# Patient Record
Sex: FEMALE | Race: WHITE | NOT HISPANIC OR LATINO | Employment: FULL TIME | URBAN - METROPOLITAN AREA
[De-identification: names, ages, dates, MRNs, and addresses within clinical notes are randomized per-mention and may not be internally consistent; named-entity substitution may affect disease eponyms.]

---

## 2018-06-27 ENCOUNTER — HOSPITAL ENCOUNTER (OUTPATIENT)
Dept: RADIOLOGY | Facility: OTHER | Age: 38
Discharge: HOME | End: 2018-06-27
Attending: SPECIALIST
Payer: COMMERCIAL

## 2018-06-27 DIAGNOSIS — R10.11 ABDOMINAL PAIN, RIGHT UPPER QUADRANT: ICD-10-CM

## 2018-06-27 PROCEDURE — 76705 ECHO EXAM OF ABDOMEN: CPT | Mod: 26,,, | Performed by: RADIOLOGY

## 2018-06-27 PROCEDURE — 76705 ECHO EXAM OF ABDOMEN: CPT | Mod: TC

## 2022-02-04 ENCOUNTER — TELEPHONE (OUTPATIENT)
Dept: OBSTETRICS AND GYNECOLOGY | Facility: CLINIC | Age: 42
End: 2022-02-04
Payer: COMMERCIAL

## 2022-03-11 ENCOUNTER — INITIAL PRENATAL (OUTPATIENT)
Dept: OBSTETRICS AND GYNECOLOGY | Facility: CLINIC | Age: 42
End: 2022-03-11
Payer: COMMERCIAL

## 2022-03-11 ENCOUNTER — PATIENT MESSAGE (OUTPATIENT)
Dept: ADMINISTRATIVE | Facility: OTHER | Age: 42
End: 2022-03-11
Payer: COMMERCIAL

## 2022-03-11 ENCOUNTER — PATIENT MESSAGE (OUTPATIENT)
Dept: OBSTETRICS AND GYNECOLOGY | Facility: CLINIC | Age: 42
End: 2022-03-11
Payer: COMMERCIAL

## 2022-03-11 VITALS — DIASTOLIC BLOOD PRESSURE: 70 MMHG | SYSTOLIC BLOOD PRESSURE: 130 MMHG | WEIGHT: 154.56 LBS

## 2022-03-11 DIAGNOSIS — O09.292 HISTORY OF IUGR (INTRAUTERINE GROWTH RETARDATION) AND STILLBIRTH, CURRENTLY PREGNANT, SECOND TRIMESTER: ICD-10-CM

## 2022-03-11 DIAGNOSIS — O09.32 INITIAL OBSTETRIC VISIT IN SECOND TRIMESTER: Primary | ICD-10-CM

## 2022-03-11 DIAGNOSIS — O09.92 SUPERVISION OF HIGH RISK PREGNANCY IN SECOND TRIMESTER: ICD-10-CM

## 2022-03-11 DIAGNOSIS — K21.9 GASTROESOPHAGEAL REFLUX IN PREGNANCY: ICD-10-CM

## 2022-03-11 DIAGNOSIS — O99.619 GASTROESOPHAGEAL REFLUX IN PREGNANCY: ICD-10-CM

## 2022-03-11 DIAGNOSIS — O09.522 MULTIGRAVIDA OF ADVANCED MATERNAL AGE IN SECOND TRIMESTER: ICD-10-CM

## 2022-03-11 DIAGNOSIS — Z87.59 HISTORY OF GESTATIONAL HYPERTENSION: ICD-10-CM

## 2022-03-11 PROBLEM — O09.299 HISTORY OF IUGR (INTRAUTERINE GROWTH RETARDATION) AND STILLBIRTH, CURRENTLY PREGNANT: Status: ACTIVE | Noted: 2022-03-11

## 2022-03-11 PROBLEM — O13.9 PREGNANCY INDUCED HYPERTENSION: Status: RESOLVED | Noted: 2022-03-11 | Resolved: 2022-03-11

## 2022-03-11 PROBLEM — N20.0 CALCULUS OF KIDNEY: Status: ACTIVE | Noted: 2022-03-11

## 2022-03-11 PROBLEM — O09.299 HISTORY OF IUGR (INTRAUTERINE GROWTH RETARDATION) AND STILLBIRTH, CURRENTLY PREGNANT: Status: RESOLVED | Noted: 2022-03-11 | Resolved: 2022-03-11

## 2022-03-11 PROBLEM — E04.9 GOITER: Status: ACTIVE | Noted: 2022-03-11

## 2022-03-11 PROBLEM — O13.9 PREGNANCY INDUCED HYPERTENSION: Status: ACTIVE | Noted: 2022-03-11

## 2022-03-11 PROCEDURE — 0502F PR SUBSEQUENT PRENATAL CARE: ICD-10-PCS | Mod: CPTII,S$GLB,, | Performed by: OBSTETRICS & GYNECOLOGY

## 2022-03-11 PROCEDURE — 99999 PR PBB SHADOW E&M-EST. PATIENT-LVL III: CPT | Mod: PBBFAC,,, | Performed by: OBSTETRICS & GYNECOLOGY

## 2022-03-11 PROCEDURE — 87086 URINE CULTURE/COLONY COUNT: CPT | Performed by: OBSTETRICS & GYNECOLOGY

## 2022-03-11 PROCEDURE — 87591 N.GONORRHOEAE DNA AMP PROB: CPT | Performed by: OBSTETRICS & GYNECOLOGY

## 2022-03-11 PROCEDURE — 99999 PR PBB SHADOW E&M-EST. PATIENT-LVL III: ICD-10-PCS | Mod: PBBFAC,,, | Performed by: OBSTETRICS & GYNECOLOGY

## 2022-03-11 PROCEDURE — 87491 CHLMYD TRACH DNA AMP PROBE: CPT | Performed by: OBSTETRICS & GYNECOLOGY

## 2022-03-11 PROCEDURE — 0502F SUBSEQUENT PRENATAL CARE: CPT | Mod: CPTII,S$GLB,, | Performed by: OBSTETRICS & GYNECOLOGY

## 2022-03-11 RX ORDER — PRENATAL VIT,CAL 74/IRON/FOLIC 27 MG-1 MG
TABLET ORAL
COMMUNITY
End: 2023-08-25

## 2022-03-11 RX ORDER — NAPROXEN SODIUM 220 MG/1
81 TABLET, FILM COATED ORAL DAILY
Qty: 90 TABLET | Refills: 3 | Status: SHIPPED | OUTPATIENT
Start: 2022-03-11 | End: 2022-08-26

## 2022-03-11 RX ORDER — PANTOPRAZOLE SODIUM 20 MG/1
20 TABLET, DELAYED RELEASE ORAL DAILY
Qty: 30 TABLET | Refills: 1 | Status: SHIPPED | OUTPATIENT
Start: 2022-03-11 | End: 2022-06-28

## 2022-03-11 NOTE — PROGRESS NOTES
Chief Complaint: Initial OB Visit     HPI:      Maggie Krishnan is a 41 y.o.  who is known to me presents complaining of absence of menses. Pregnancy confirmed with Dr. Scottie Pham.  JUDY 2022 by 7 week US.  Denies nausea and emesis.  Notes decreased appetite. Denies pelvic pain.  Denies cramping.  Denies vaginal bleeding.     OB history complicated by AMA and history of GHTN and IUGR in prior pregnancy.   Pregnancy was not planned but is desired.     No LMP recorded. Patient is pregnant.    COVID vaccine: Completed + booster  Flu shot: Completed  ZIKA travel or exposure: Denies    Past Medical History:   Diagnosis Date    History of gestational hypertension     History of IUGR (intrauterine growth retardation) and stillbirth, currently pregnant 3/11/2022     Past Surgical History:   Procedure Laterality Date    FOOT SURGERY Left     WISDOM TOOTH EXTRACTION       Social History     Tobacco Use    Smoking status: Never Smoker    Smokeless tobacco: Never Used   Substance Use Topics    Alcohol use: Never    Drug use: Never     Family History   Problem Relation Age of Onset    Breast cancer Neg Hx     Colon cancer Neg Hx     Ovarian cancer Neg Hx      OB History    Para Term  AB Living   2 1 1         SAB IAB Ectopic Multiple Live Births                  # Outcome Date GA Lbr Nick/2nd Weight Sex Delivery Anes PTL Lv   2 Current            1 Term 17 37w0d   F Vag-Spont          Current Outpatient Medications:     prenatal vit,yareli 74-iron-folic (PRENATAL LOW IRON) 27 mg iron- 1 mg Tab, Take by mouth., Disp: , Rfl:   ROS:     GENERAL: Denies weight gain or weight loss. Feeling well overall.   SKIN: Denies rash or lesions.   BREASTS: Denies lumps or nipple discharge. + tenderness.  ABDOMEN: Denies abdominal pain, constipation, diarrhea. no nausea/no vomiting  URINARY: Denies dysuria, hematuria. + frequency.  NEUROLOGIC: Denies syncope or weakness.   PSYCHIATRIC: Denies  depression, anxiety or mood swings.    Physical Exam:      PHYSICAL EXAM:  /70   Wt 70.1 kg (154 lb 8.7 oz)   There is no height or weight on file to calculate BMI.     APPEARANCE: Well nourished, well developed, in no acute distress.  PSYCH: Appropriate mood and affect.  EXTREMITIES: No edema.  BREAST: No masses or skin lesions. No axillary lymphadenopathy.  PELVIC: Vulva without lesions. Vagina without lesions, discharge or bleeding. Cervical os closed without lesions, bleeding or discharge.    No results found for this or any previous visit.    Assessment/Plan:       ICD-10-CM ICD-9-CM    1. Initial obstetric visit in second trimester  O09.32 V23.7    2. Supervision of high risk pregnancy in second trimester  O09.92 V23.9    3. Multigravida of advanced maternal age in second trimester  O09.522 659.63          Counseling:      UPT positive.  EDC by 7 week US: 9/16/2022.     1. Dating US and initial OB labs reviewed.  2. Patient was counseled today on proper weight gain based on the [place] [employer]'s recommendations based on her pre-pregnancy weight.     The patient's Body mass index is There is no height or weight on file to calculate BMI.  -- Discussed IOM recommended weight gain of:          Weight category  Pre pre BMI                 Recommended TWG          Underweight Less than 18.5             28-40            Normal Weight  18.5-24.9                     25-35            Overweight        25-29.9                        15-25            Obese                 30 and greater              11-20    -- Discussed criteria for delivery at [facility] r/t excessive pre-preg weight or excessive weight gain:          Pre-pregnancy BMI over 40 or excess pregnancy weight gain defined as:          Pre-preg BMI < 18.5; Excess weight gain = > 60 pound          Pre-preg BMI 18.5-24.9;  Excess weight gain = > 53 pounds          Pre-preg BMI 25-29.9;  Excess weight gain = > 38 pounds          Pre-preg BMI > 30;  Excess  weight gain = > 30 pounds    3. Discussed foods to avoid in pregnancy (i.e. sushi, fish that are high in mercury, deli meat, and unpasteurized cheeses).   4. Discussed prenatal vitamin options and folic acid (i.e. stool softener, DHA).   5. Discussed nausea and emesis in pregnancy.  Recommend Unisom/B6 or Bonjesta.  Patient to notify the clinic if symptoms are refractory.   6. Optional genetic testing discussed - patient pending.   7. Optional carrier screening discussed - patient declines.  8. Safety of exercise discussed with patient, and continued active lifestyle encouraged.  9. Coronavirus, Influenza, and Zika virus precautions for both patient and her spouse.  10. Normal course of prenatal care reviewed.  11. Medications safe in pregnancy discussed and list provided.  12. Enrolled in [employer].  13. RTC 4 weeks for routine OB visit.       38 minutes of face-to-face discussion occurred during today's visit.     Use of the [employer] Patient Portal discussed and encouraged during today's visit.         Evelyn Rubin MD  [facility]  03/11/2022

## 2022-03-12 LAB — BACTERIA UR CULT: NO GROWTH

## 2022-03-15 ENCOUNTER — PATIENT MESSAGE (OUTPATIENT)
Dept: OBSTETRICS AND GYNECOLOGY | Facility: CLINIC | Age: 42
End: 2022-03-15
Payer: COMMERCIAL

## 2022-03-17 ENCOUNTER — PATIENT MESSAGE (OUTPATIENT)
Dept: OBSTETRICS AND GYNECOLOGY | Facility: CLINIC | Age: 42
End: 2022-03-17
Payer: COMMERCIAL

## 2022-03-17 LAB
C TRACH DNA SPEC QL NAA+PROBE: NOT DETECTED
N GONORRHOEA DNA SPEC QL NAA+PROBE: NOT DETECTED

## 2022-04-06 ENCOUNTER — ROUTINE PRENATAL (OUTPATIENT)
Dept: OBSTETRICS AND GYNECOLOGY | Facility: CLINIC | Age: 42
End: 2022-04-06
Payer: COMMERCIAL

## 2022-04-06 VITALS — SYSTOLIC BLOOD PRESSURE: 126 MMHG | DIASTOLIC BLOOD PRESSURE: 70 MMHG | WEIGHT: 155.88 LBS

## 2022-04-06 DIAGNOSIS — O09.292 HISTORY OF IUGR (INTRAUTERINE GROWTH RETARDATION) AND STILLBIRTH, CURRENTLY PREGNANT, SECOND TRIMESTER: ICD-10-CM

## 2022-04-06 DIAGNOSIS — O09.92 SUPERVISION OF HIGH RISK PREGNANCY IN SECOND TRIMESTER: Primary | ICD-10-CM

## 2022-04-06 DIAGNOSIS — O09.522 MULTIGRAVIDA OF ADVANCED MATERNAL AGE IN SECOND TRIMESTER: ICD-10-CM

## 2022-04-06 DIAGNOSIS — Z3A.16 16 WEEKS GESTATION OF PREGNANCY: ICD-10-CM

## 2022-04-06 DIAGNOSIS — Z87.59 HISTORY OF GESTATIONAL HYPERTENSION: ICD-10-CM

## 2022-04-06 DIAGNOSIS — K21.9 GASTROESOPHAGEAL REFLUX IN PREGNANCY: ICD-10-CM

## 2022-04-06 DIAGNOSIS — O99.619 GASTROESOPHAGEAL REFLUX IN PREGNANCY: ICD-10-CM

## 2022-04-06 PROCEDURE — 0502F PR SUBSEQUENT PRENATAL CARE: ICD-10-PCS | Mod: CPTII,S$GLB,, | Performed by: OBSTETRICS & GYNECOLOGY

## 2022-04-06 PROCEDURE — 0502F SUBSEQUENT PRENATAL CARE: CPT | Mod: CPTII,S$GLB,, | Performed by: OBSTETRICS & GYNECOLOGY

## 2022-04-06 NOTE — PROGRESS NOTES
Patient seen and examined.  No complaints. + FM. Denies LOF, cramping, VB. GERD improving. Anatomy US scheduled.  BP well controlled. RTC 4 weeks for routine OB care.

## 2022-04-25 ENCOUNTER — PATIENT MESSAGE (OUTPATIENT)
Dept: MATERNAL FETAL MEDICINE | Facility: CLINIC | Age: 42
End: 2022-04-25
Payer: COMMERCIAL

## 2022-04-26 ENCOUNTER — OFFICE VISIT (OUTPATIENT)
Dept: MATERNAL FETAL MEDICINE | Facility: CLINIC | Age: 42
End: 2022-04-26
Payer: COMMERCIAL

## 2022-04-26 ENCOUNTER — PROCEDURE VISIT (OUTPATIENT)
Dept: MATERNAL FETAL MEDICINE | Facility: CLINIC | Age: 42
End: 2022-04-26
Payer: COMMERCIAL

## 2022-04-26 VITALS
WEIGHT: 155.19 LBS | SYSTOLIC BLOOD PRESSURE: 117 MMHG | BODY MASS INDEX: 24.36 KG/M2 | DIASTOLIC BLOOD PRESSURE: 65 MMHG | HEIGHT: 67 IN

## 2022-04-26 DIAGNOSIS — N20.0 CALCULUS OF KIDNEY: ICD-10-CM

## 2022-04-26 DIAGNOSIS — O09.92 SUPERVISION OF HIGH RISK PREGNANCY IN SECOND TRIMESTER: ICD-10-CM

## 2022-04-26 DIAGNOSIS — Z87.59 HISTORY OF GESTATIONAL HYPERTENSION: ICD-10-CM

## 2022-04-26 DIAGNOSIS — Z36.2 ENCOUNTER FOR FOLLOW-UP ULTRASOUND OF FETAL ANATOMY: Primary | ICD-10-CM

## 2022-04-26 DIAGNOSIS — E04.9 GOITER: ICD-10-CM

## 2022-04-26 DIAGNOSIS — O09.292 HISTORY OF IUGR (INTRAUTERINE GROWTH RETARDATION) AND STILLBIRTH, CURRENTLY PREGNANT, SECOND TRIMESTER: ICD-10-CM

## 2022-04-26 PROCEDURE — 3078F PR MOST RECENT DIASTOLIC BLOOD PRESSURE < 80 MM HG: ICD-10-PCS | Mod: CPTII,S$GLB,, | Performed by: OBSTETRICS & GYNECOLOGY

## 2022-04-26 PROCEDURE — 99204 OFFICE O/P NEW MOD 45 MIN: CPT | Mod: 25,S$GLB,, | Performed by: OBSTETRICS & GYNECOLOGY

## 2022-04-26 PROCEDURE — 3008F BODY MASS INDEX DOCD: CPT | Mod: CPTII,S$GLB,, | Performed by: OBSTETRICS & GYNECOLOGY

## 2022-04-26 PROCEDURE — 76811 OB US DETAILED SNGL FETUS: CPT | Mod: S$GLB,,, | Performed by: OBSTETRICS & GYNECOLOGY

## 2022-04-26 PROCEDURE — 3074F PR MOST RECENT SYSTOLIC BLOOD PRESSURE < 130 MM HG: ICD-10-PCS | Mod: CPTII,S$GLB,, | Performed by: OBSTETRICS & GYNECOLOGY

## 2022-04-26 PROCEDURE — 99204 PR OFFICE/OUTPT VISIT, NEW, LEVL IV, 45-59 MIN: ICD-10-PCS | Mod: 25,S$GLB,, | Performed by: OBSTETRICS & GYNECOLOGY

## 2022-04-26 PROCEDURE — 99999 PR PBB SHADOW E&M-EST. PATIENT-LVL III: CPT | Mod: PBBFAC,,, | Performed by: OBSTETRICS & GYNECOLOGY

## 2022-04-26 PROCEDURE — 3074F SYST BP LT 130 MM HG: CPT | Mod: CPTII,S$GLB,, | Performed by: OBSTETRICS & GYNECOLOGY

## 2022-04-26 PROCEDURE — 3078F DIAST BP <80 MM HG: CPT | Mod: CPTII,S$GLB,, | Performed by: OBSTETRICS & GYNECOLOGY

## 2022-04-26 PROCEDURE — 99999 PR PBB SHADOW E&M-EST. PATIENT-LVL III: ICD-10-PCS | Mod: PBBFAC,,, | Performed by: OBSTETRICS & GYNECOLOGY

## 2022-04-26 PROCEDURE — 3008F PR BODY MASS INDEX (BMI) DOCUMENTED: ICD-10-PCS | Mod: CPTII,S$GLB,, | Performed by: OBSTETRICS & GYNECOLOGY

## 2022-04-26 PROCEDURE — 76811 US MFM PROCEDURE (VIEWPOINT): ICD-10-PCS | Mod: S$GLB,,, | Performed by: OBSTETRICS & GYNECOLOGY

## 2022-04-26 NOTE — PROGRESS NOTES
MATERNAL-FETAL MEDICINE   CONSULT NOTE    Provider requesting consultation: Dr. IRON Rubin    SUBJECTIVE:     Ms. Maggie Krishnan is a 41 y.o.  female with IUP at 19w4d who is seen in consultation by MFM for evaluation and management of:  1. Advanced maternal age  2. History FGR  3. Gestational hypertension  4. White coat syndrome  5. Goiter with nodules- normal thyroid function  6. FOB with Klippel Feil syndrome    Ms. Krishnan presents with her family, her mother and (FOB -who has Klippel Feil Syndrome. She is doing better, she'd had lots of digestive issues as result of pregnancy which have improved. Her problems included weight loss due to severe GERD and very little vomiting. She reports history of white coat syndrome which improves with repeat blood pressure check. She reports her eating has improved and the GERD nearly resolved.        Medication List with Changes/Refills   Current Medications    ASPIRIN 81 MG CHEW    Take 1 tablet (81 mg total) by mouth once daily.    LORATADINE (CLARITIN ORAL)    Take by mouth as needed.    MULTIVITAMIN/FOLIC ACID/DHA (MULTIVITAMIN-FA-DHA ORAL)    Take by mouth. Patient takes every other day due to iron in medication    PANTOPRAZOLE (PROTONIX) 20 MG TABLET    Take 1 tablet (20 mg total) by mouth once daily.    PRENATAL VIT,MOHAMUD 74-IRON-FOLIC (PRENATAL LOW IRON) 27 MG IRON- 1 MG TAB    Take by mouth.       Review of patient's allergies indicates:   Allergen Reactions    Alcohol     Sulfa (sulfonamide antibiotics) Nausea And Vomiting       PMH:  Past Medical History:   Diagnosis Date    History of gestational hypertension     History of IUGR (intrauterine growth retardation) and stillbirth, currently pregnant 3/11/2022       PObHx:  OB History    Para Term  AB Living   2 1 1         SAB IAB Ectopic Multiple Live Births                  # Outcome Date GA Lbr Nick/2nd Weight Sex Delivery Anes PTL Lv   2 Current            1 Term 17 37w0d  2.126  "kg (4 lb 11 oz) F Vag-Spont          PSH:  Past Surgical History:   Procedure Laterality Date    FOOT SURGERY Left     WISDOM TOOTH EXTRACTION         Family history: Negative Breast Ca, Colon Ca, Ovarian Ca    Social history: reports that she has never smoked. She has never used smokeless tobacco. She reports that she does not drink alcohol and does not use drugs.    Genetic history: Klippel Feil syndrome in FOB    Objective:   /65   Ht 5' 7" (1.702 m)   Wt 70.4 kg (155 lb 3.3 oz)   BMI 24.31 kg/m²     Ultrasound performed. See viewpoint for full ultrasound report.      Significant labs/imaging:  NIPT negative  Myriad screening test- negative       ASSESSMENT/PLAN:     41 y.o.  female with IUP at 19w4d       Advanced Maternal Age (second trimester)  Today I counseled the patient on the relationship between maternal age and fetal aneuploidy.  We discussed the risks and benefits of screening tests versus definitive genetic testing (amniocentesis). She was counseled about her specific age-related risk of fetal aneuploidy. We discussed the limitations of ultrasound in the definitive diagnosis of fetal aneuploidy.  I quoted the patient a 1 in 900 procedure related risk of fetal loss with genetic amniocentesis.  We also discussed cell free fetal DNA screening which she already had with a negative result.  Her questions were answered and after today's consultation she did not want to pursue amniocentesis.    Advanced maternal age (AMA) increased the risk of  preeclampsia, gestational diabetes, and fetal growth restriction.    Recommendations:   Detailed anatomic survey at 19-20 weeks - started and will complete at next visit    Follow-up fetal ultrasound at 32-34 weeks for interval fetal growth   Consider low dose aspirin at 12-16 weeks for preeclampsia risk reduction- started   In addition, because of the increased risk of stillbirth noted in women over the age of 40 at time of delivery, we " "recommend weekly fetal surveillance (ex. NST/MVP) starting at 32 weeks until delivery in this group. (This is to be ordered/arranged by primary OB provider)   In this population of women over the age of 40 at time of delivery, consider delivery at 39-40 weeks, but no later than 40 weeks 6 days.    History of Fetal growth restriction (FGR)  I reviewed the patient's obstetric history which is remarkable for a previous pregnancy complicated by FGR. The FGR child weighed [id number] at birth. She is a doing well, patient reports child is " perfect". She reports two female relatives including her grandmother that are 4'11" and otherwise normal.. I counseled the patient regarding the recurrence risk for FGR. We discussed recommendations for management during this pregnancy including evaluation of fetal growth in the third trimester. Low molecular weight heparin and aspirin have not been beneficial in preventing recurrent FGR. If the patient had preeclampsia or gestational hypertension associated with FGR, low dose aspirin therapy should be initiated at 12-16 weeks in subsequent pregnancies. She is currently taking low dose aspirin and tolerating well..     Recommendations:  Fetal growth ultrasounds in third trimester, every 4-6 weeks  Antepartum surveillance is not indicated in absence of FGR diagnosis or other maternal-fetal indications for prenatal testing  Monitor gestational weight gain, encourage prenatal vitamin      Prior preeclampsia-   Pt record indicates gestational hypertension but she states she has white coat syndrome. Today her blood pressure was borderline elevated at 139/75 with repeat of  117/65. I discussed the need to differentiate due to severe consequence to mother and fetus is diagnosis is untreated and uncertain.   I reviewed the patient's obstetric history which is remarkable for development of gestational hypertension at 37 weeks gestation with FGR. We reviewed the risk of recurrence in subsequent " pregnancies. Subsequent pregnancies in women with gestational HTN may progress to severe preeclampsia along with associated complications including  are at risk of abruption,  birth, FGR, and increased  mortality. We reviewed the role of low dose aspirin therapy in regards to preeclampsia risk reduction in subsequent pregnancies.    Recommendations:   Started aspirin 81 mg daily at 12-16 weeks for preeclampsia risk reduction   Obtain baseline preeclampsia studies: (CMP, CBC, 24 hour urine protein or urine protein/creatinine ratio)   Close surveillance for signs/symptoms of preeclampsia in second/third trimester and postpartum period      Goiter- Monitor for growth and abnormal activity due to history of goiter with nodule - biopsied with negative findings  Recommendation  1. TSH study now and post partum    FOLLOW UP:   F/u in 4 weeks for US visit to complete anatomy      45 minutes of total time spent on the encounter, which includes face to face time and non-face to face time preparing to see the patient (eg, review of tests), obtaining and/or reviewing separately obtained history, documenting clinical information in the electronic or other health record, independently interpreting results (not separately reported) and communicating results to the patient/family/caregiver, or care coordination (not separately reported).      Yudy Davila  Maternal-Fetal Medicine    Electronically Signed by Yudy Davila 2022

## 2022-05-02 ENCOUNTER — ROUTINE PRENATAL (OUTPATIENT)
Dept: OBSTETRICS AND GYNECOLOGY | Facility: CLINIC | Age: 42
End: 2022-05-02
Payer: COMMERCIAL

## 2022-05-02 VITALS — BODY MASS INDEX: 24.72 KG/M2 | WEIGHT: 157.88 LBS

## 2022-05-02 DIAGNOSIS — Z34.92 ENCOUNTER FOR SUPERVISION OF NORMAL PREGNANCY IN SECOND TRIMESTER, UNSPECIFIED GRAVIDITY: Primary | ICD-10-CM

## 2022-05-02 PROCEDURE — 0502F PR SUBSEQUENT PRENATAL CARE: ICD-10-PCS | Mod: CPTII,S$GLB,, | Performed by: STUDENT IN AN ORGANIZED HEALTH CARE EDUCATION/TRAINING PROGRAM

## 2022-05-02 PROCEDURE — 0502F SUBSEQUENT PRENATAL CARE: CPT | Mod: CPTII,S$GLB,, | Performed by: STUDENT IN AN ORGANIZED HEALTH CARE EDUCATION/TRAINING PROGRAM

## 2022-05-02 NOTE — PROGRESS NOTES
Pt doing well. No complaints. Has seen MFM. Denies vaginal bleeding, LOF, contractions. Reports regular fetal movement. Denies symptoms of pre E.  VS reviewed.  Labor and pre E precautions reviewed.   RTC:4 weeks with labs

## 2022-06-02 ENCOUNTER — PATIENT MESSAGE (OUTPATIENT)
Dept: MATERNAL FETAL MEDICINE | Facility: CLINIC | Age: 42
End: 2022-06-02
Payer: COMMERCIAL

## 2022-06-03 ENCOUNTER — PROCEDURE VISIT (OUTPATIENT)
Dept: MATERNAL FETAL MEDICINE | Facility: CLINIC | Age: 42
End: 2022-06-03
Payer: COMMERCIAL

## 2022-06-03 ENCOUNTER — LAB VISIT (OUTPATIENT)
Dept: LAB | Facility: OTHER | Age: 42
End: 2022-06-03
Attending: OBSTETRICS & GYNECOLOGY
Payer: COMMERCIAL

## 2022-06-03 ENCOUNTER — TELEPHONE (OUTPATIENT)
Dept: OBSTETRICS AND GYNECOLOGY | Facility: CLINIC | Age: 42
End: 2022-06-03

## 2022-06-03 ENCOUNTER — PATIENT MESSAGE (OUTPATIENT)
Dept: ADMINISTRATIVE | Facility: OTHER | Age: 42
End: 2022-06-03
Payer: COMMERCIAL

## 2022-06-03 ENCOUNTER — PATIENT MESSAGE (OUTPATIENT)
Dept: OBSTETRICS AND GYNECOLOGY | Facility: CLINIC | Age: 42
End: 2022-06-03
Payer: COMMERCIAL

## 2022-06-03 ENCOUNTER — ROUTINE PRENATAL (OUTPATIENT)
Dept: OBSTETRICS AND GYNECOLOGY | Facility: CLINIC | Age: 42
End: 2022-06-03
Payer: COMMERCIAL

## 2022-06-03 VITALS
BODY MASS INDEX: 24.17 KG/M2 | WEIGHT: 154.31 LBS | SYSTOLIC BLOOD PRESSURE: 110 MMHG | DIASTOLIC BLOOD PRESSURE: 62 MMHG

## 2022-06-03 DIAGNOSIS — O16.2 HYPERTENSION AFFECTING PREGNANCY IN SECOND TRIMESTER: Primary | ICD-10-CM

## 2022-06-03 DIAGNOSIS — K21.9 GASTROESOPHAGEAL REFLUX IN PREGNANCY: ICD-10-CM

## 2022-06-03 DIAGNOSIS — Z36.2 ENCOUNTER FOR FOLLOW-UP ULTRASOUND OF FETAL ANATOMY: ICD-10-CM

## 2022-06-03 DIAGNOSIS — Z87.59 HISTORY OF GESTATIONAL HYPERTENSION: ICD-10-CM

## 2022-06-03 DIAGNOSIS — O24.419 GESTATIONAL DIABETES MELLITUS (GDM) IN SECOND TRIMESTER, GESTATIONAL DIABETES METHOD OF CONTROL UNSPECIFIED: Primary | ICD-10-CM

## 2022-06-03 DIAGNOSIS — O09.522 MULTIGRAVIDA OF ADVANCED MATERNAL AGE IN SECOND TRIMESTER: ICD-10-CM

## 2022-06-03 DIAGNOSIS — O09.92 SUPERVISION OF HIGH RISK PREGNANCY IN SECOND TRIMESTER: ICD-10-CM

## 2022-06-03 DIAGNOSIS — Z3A.28 28 WEEKS GESTATION OF PREGNANCY: ICD-10-CM

## 2022-06-03 DIAGNOSIS — O16.2 HYPERTENSION AFFECTING PREGNANCY IN SECOND TRIMESTER: ICD-10-CM

## 2022-06-03 DIAGNOSIS — O09.292 HISTORY OF IUGR (INTRAUTERINE GROWTH RETARDATION) AND STILLBIRTH, CURRENTLY PREGNANT, SECOND TRIMESTER: ICD-10-CM

## 2022-06-03 DIAGNOSIS — O99.619 GASTROESOPHAGEAL REFLUX IN PREGNANCY: ICD-10-CM

## 2022-06-03 LAB
ALBUMIN SERPL BCP-MCNC: 3.2 G/DL (ref 3.5–5.2)
ALP SERPL-CCNC: 59 U/L (ref 55–135)
ALT SERPL W/O P-5'-P-CCNC: 9 U/L (ref 10–44)
ANION GAP SERPL CALC-SCNC: 13 MMOL/L (ref 8–16)
AST SERPL-CCNC: 15 U/L (ref 10–40)
BASOPHILS # BLD AUTO: 0.03 K/UL (ref 0–0.2)
BASOPHILS NFR BLD: 0.4 % (ref 0–1.9)
BILIRUB SERPL-MCNC: 0.3 MG/DL (ref 0.1–1)
BUN SERPL-MCNC: 7 MG/DL (ref 6–20)
CALCIUM SERPL-MCNC: 9.3 MG/DL (ref 8.7–10.5)
CHLORIDE SERPL-SCNC: 101 MMOL/L (ref 95–110)
CO2 SERPL-SCNC: 22 MMOL/L (ref 23–29)
CREAT SERPL-MCNC: 0.7 MG/DL (ref 0.5–1.4)
CREAT UR-MCNC: 18 MG/DL (ref 15–325)
DIFFERENTIAL METHOD: ABNORMAL
EOSINOPHIL # BLD AUTO: 0 K/UL (ref 0–0.5)
EOSINOPHIL NFR BLD: 0.3 % (ref 0–8)
ERYTHROCYTE [DISTWIDTH] IN BLOOD BY AUTOMATED COUNT: 12.9 % (ref 11.5–14.5)
EST. GFR  (AFRICAN AMERICAN): >60 ML/MIN/1.73 M^2
EST. GFR  (NON AFRICAN AMERICAN): >60 ML/MIN/1.73 M^2
GLUCOSE SERPL-MCNC: 216 MG/DL (ref 70–110)
GLUCOSE SERPL-MCNC: 221 MG/DL (ref 70–140)
HCT VFR BLD AUTO: 32.1 % (ref 37–48.5)
HGB BLD-MCNC: 10.7 G/DL (ref 12–16)
IMM GRANULOCYTES # BLD AUTO: 0.01 K/UL (ref 0–0.04)
IMM GRANULOCYTES NFR BLD AUTO: 0.1 % (ref 0–0.5)
LYMPHOCYTES # BLD AUTO: 0.9 K/UL (ref 1–4.8)
LYMPHOCYTES NFR BLD: 13.2 % (ref 18–48)
MCH RBC QN AUTO: 31.5 PG (ref 27–31)
MCHC RBC AUTO-ENTMCNC: 33.3 G/DL (ref 32–36)
MCV RBC AUTO: 94 FL (ref 82–98)
MONOCYTES # BLD AUTO: 0.3 K/UL (ref 0.3–1)
MONOCYTES NFR BLD: 3.5 % (ref 4–15)
NEUTROPHILS # BLD AUTO: 5.8 K/UL (ref 1.8–7.7)
NEUTROPHILS NFR BLD: 82.5 % (ref 38–73)
NRBC BLD-RTO: 0 /100 WBC
PLATELET # BLD AUTO: 271 K/UL (ref 150–450)
PMV BLD AUTO: 10 FL (ref 9.2–12.9)
POTASSIUM SERPL-SCNC: 3.4 MMOL/L (ref 3.5–5.1)
PROT SERPL-MCNC: 6.3 G/DL (ref 6–8.4)
PROT UR-MCNC: <7 MG/DL (ref 0–15)
PROT/CREAT UR: NORMAL MG/G{CREAT} (ref 0–0.2)
RBC # BLD AUTO: 3.4 M/UL (ref 4–5.4)
SODIUM SERPL-SCNC: 136 MMOL/L (ref 136–145)
T4 FREE SERPL-MCNC: 1.03 NG/DL (ref 0.71–1.51)
TSH SERPL DL<=0.005 MIU/L-ACNC: 0.09 UIU/ML (ref 0.4–4)
WBC # BLD AUTO: 7.05 K/UL (ref 3.9–12.7)

## 2022-06-03 PROCEDURE — 36415 COLL VENOUS BLD VENIPUNCTURE: CPT | Performed by: OBSTETRICS & GYNECOLOGY

## 2022-06-03 PROCEDURE — 0502F PR SUBSEQUENT PRENATAL CARE: ICD-10-PCS | Mod: CPTII,S$GLB,, | Performed by: OBSTETRICS & GYNECOLOGY

## 2022-06-03 PROCEDURE — 99999 PR PBB SHADOW E&M-EST. PATIENT-LVL III: ICD-10-PCS | Mod: PBBFAC,,, | Performed by: OBSTETRICS & GYNECOLOGY

## 2022-06-03 PROCEDURE — 85025 COMPLETE CBC W/AUTO DIFF WBC: CPT | Performed by: OBSTETRICS & GYNECOLOGY

## 2022-06-03 PROCEDURE — 84439 ASSAY OF FREE THYROXINE: CPT | Performed by: OBSTETRICS & GYNECOLOGY

## 2022-06-03 PROCEDURE — 0502F SUBSEQUENT PRENATAL CARE: CPT | Mod: CPTII,S$GLB,, | Performed by: OBSTETRICS & GYNECOLOGY

## 2022-06-03 PROCEDURE — 84443 ASSAY THYROID STIM HORMONE: CPT | Performed by: OBSTETRICS & GYNECOLOGY

## 2022-06-03 PROCEDURE — 76816 US MFM PROCEDURE (VIEWPOINT): ICD-10-PCS | Mod: S$GLB,,, | Performed by: OBSTETRICS & GYNECOLOGY

## 2022-06-03 PROCEDURE — 76816 OB US FOLLOW-UP PER FETUS: CPT | Mod: S$GLB,,, | Performed by: OBSTETRICS & GYNECOLOGY

## 2022-06-03 PROCEDURE — 82950 GLUCOSE TEST: CPT | Performed by: OBSTETRICS & GYNECOLOGY

## 2022-06-03 PROCEDURE — 80053 COMPREHEN METABOLIC PANEL: CPT | Performed by: OBSTETRICS & GYNECOLOGY

## 2022-06-03 PROCEDURE — 82570 ASSAY OF URINE CREATININE: CPT | Performed by: OBSTETRICS & GYNECOLOGY

## 2022-06-03 PROCEDURE — 99999 PR PBB SHADOW E&M-EST. PATIENT-LVL III: CPT | Mod: PBBFAC,,, | Performed by: OBSTETRICS & GYNECOLOGY

## 2022-06-03 NOTE — PROGRESS NOTES
Patient seen and examined.  No complaints. + FM. Denies VB, LOF, contractions.  Has MFM consultation previously with recommendations for:  Hx GHTN in prior pregnancy - CBC, CMP, P:C  Thyroid goiter - TSH  Hx IUGR - serial growth US starting at 28 weeks  Labs drawn today.  Growth US today. Start weekly PNT at 32 weeks.     RTC 2 weeks for routine OB care.

## 2022-06-03 NOTE — TELEPHONE ENCOUNTER
Called patient to review lab results which show gestational diabetes and subclinical hyperthyroidism.      Referral to diabetic education placed.  Will notify MFM of GDM and TSH levels.     Results for orders placed or performed in visit on 06/03/22   CBC Auto Differential   Result Value Ref Range    WBC 7.05 3.90 - 12.70 K/uL    RBC 3.40 (L) 4.00 - 5.40 M/uL    Hemoglobin 10.7 (L) 12.0 - 16.0 g/dL    Hematocrit 32.1 (L) 37.0 - 48.5 %    MCV 94 82 - 98 fL    MCH 31.5 (H) 27.0 - 31.0 pg    MCHC 33.3 32.0 - 36.0 g/dL    RDW 12.9 11.5 - 14.5 %    Platelets 271 150 - 450 K/uL    MPV 10.0 9.2 - 12.9 fL    Immature Granulocytes 0.1 0.0 - 0.5 %    Gran # (ANC) 5.8 1.8 - 7.7 K/uL    Immature Grans (Abs) 0.01 0.00 - 0.04 K/uL    Lymph # 0.9 (L) 1.0 - 4.8 K/uL    Mono # 0.3 0.3 - 1.0 K/uL    Eos # 0.0 0.0 - 0.5 K/uL    Baso # 0.03 0.00 - 0.20 K/uL    nRBC 0 0 /100 WBC    Gran % 82.5 (H) 38.0 - 73.0 %    Lymph % 13.2 (L) 18.0 - 48.0 %    Mono % 3.5 (L) 4.0 - 15.0 %    Eosinophil % 0.3 0.0 - 8.0 %    Basophil % 0.4 0.0 - 1.9 %    Differential Method Automated    TSH   Result Value Ref Range    TSH 0.088 (L) 0.400 - 4.000 uIU/mL   OB Glucose Screen   Result Value Ref Range    OB Glucose Screen 221 (H) 70 - 140 mg/dL   Comprehensive Metabolic Panel   Result Value Ref Range    Sodium 136 136 - 145 mmol/L    Potassium 3.4 (L) 3.5 - 5.1 mmol/L    Chloride 101 95 - 110 mmol/L    CO2 22 (L) 23 - 29 mmol/L    Glucose 216 (H) 70 - 110 mg/dL    BUN 7 6 - 20 mg/dL    Creatinine 0.7 0.5 - 1.4 mg/dL    Calcium 9.3 8.7 - 10.5 mg/dL    Total Protein 6.3 6.0 - 8.4 g/dL    Albumin 3.2 (L) 3.5 - 5.2 g/dL    Total Bilirubin 0.3 0.1 - 1.0 mg/dL    Alkaline Phosphatase 59 55 - 135 U/L    AST 15 10 - 40 U/L    ALT 9 (L) 10 - 44 U/L    Anion Gap 13 8 - 16 mmol/L    eGFR if African American >60 >60 mL/min/1.73 m^2    eGFR if non African American >60 >60 mL/min/1.73 m^2   T4, Free   Result Value Ref Range    Free T4 1.03 0.71 - 1.51 ng/dL          Evelyn Rubin MD  [facility]  06/03/2022

## 2022-06-08 ENCOUNTER — CLINICAL SUPPORT (OUTPATIENT)
Dept: DIABETES | Facility: CLINIC | Age: 42
End: 2022-06-08
Payer: COMMERCIAL

## 2022-06-08 VITALS — WEIGHT: 158.81 LBS | BODY MASS INDEX: 24.92 KG/M2 | HEIGHT: 67 IN

## 2022-06-08 DIAGNOSIS — O24.419 GESTATIONAL DIABETES MELLITUS (GDM) IN SECOND TRIMESTER, GESTATIONAL DIABETES METHOD OF CONTROL UNSPECIFIED: ICD-10-CM

## 2022-06-08 DIAGNOSIS — O24.419 GESTATIONAL DIABETES MELLITUS (GDM) IN SECOND TRIMESTER, GESTATIONAL DIABETES METHOD OF CONTROL UNSPECIFIED: Primary | ICD-10-CM

## 2022-06-08 PROCEDURE — 99999 PR PBB SHADOW E&M-EST. PATIENT-LVL III: CPT | Mod: PBBFAC,,,

## 2022-06-08 PROCEDURE — 99999 PR PBB SHADOW E&M-EST. PATIENT-LVL III: ICD-10-PCS | Mod: PBBFAC,,,

## 2022-06-08 PROCEDURE — G0108 DIAB MANAGE TRN  PER INDIV: HCPCS | Mod: S$GLB,,, | Performed by: FAMILY MEDICINE

## 2022-06-08 PROCEDURE — G0108 PR DIAB MANAGE TRN  PER INDIV: ICD-10-PCS | Mod: S$GLB,,, | Performed by: FAMILY MEDICINE

## 2022-06-08 RX ORDER — LANCETS
1 EACH MISCELLANEOUS 4 TIMES DAILY
Qty: 200 EACH | Refills: 3 | Status: ON HOLD | OUTPATIENT
Start: 2022-06-08 | End: 2022-08-26 | Stop reason: HOSPADM

## 2022-06-08 RX ORDER — INSULIN PUMP SYRINGE, 3 ML
EACH MISCELLANEOUS
Qty: 1 EACH | Refills: 0 | Status: SHIPPED | OUTPATIENT
Start: 2022-06-08 | End: 2022-08-26

## 2022-06-08 NOTE — PROGRESS NOTES
Diabetes Care Specialist Progress Note  Author: Billie Esparza RN, CDE  Date: 6/8/2022    Program Intake  Reason for Diabetes Program Visit:: Initial Diabetes Assessment  Current diabetes risk level:: low    No results found for: LABA1C, HGBA1C    Clinical    Patient Health Rating  Compared to other people your age, how would you rate your health?: Excellent    Problem Review  Reviewed Problem List with Patient: yes    Clinical Assessment  Current Diabetes Treatment: Diet, Exercise    Nutritional Status  Meal Plan 24 Hour Recall: Breakfast, Lunch, Snack, Dinner  Meal Plan 24 Hour Recall - Breakfast: toast, peanut butter, low fat cottage cheese  Meal Plan 24 Hour Recall - Lunch: 2 beef sausages, popcorn  Meal Plan 24 Hour Recall - Dinner: 2 slices toast, avocado, goat cheese, apple, 1 hard boiled egg  Change in appetite?: No    Additional Social History     Access to Mass Media & Technology  Does the patient have access to any of the following devices or technologies?: Smart phone, Internet Access  Media or technology needs impacting ability to self-manage diabetes?: No    Cognitive/Behavioral Health  Alert and Oriented: Yes  Difficulty Thinking: No  Requires Prompting: No  Requires assistance for routine expression?: No  Cognitive or behavioral barriers impacting ability to self-manage diabetes?: No    Culture/Rastafarian  Culture or Voodoo beliefs that may impact ability to access healthcare: No    Communication  Language preference: English  Hearing Problems: No  Vision Problems: No  Communication needs impacting ability to self-manage diabetes?: No    Health Literacy  Preferred Learning Method: Face to Face  How often do you need to have someone help you read instructions, pamphlets, or written material from your doctor or pharmacy?: Rarely  Health literacy needs impacting ability to self-manage diabetes?: No    Diabetes Self-Management Skills Assessment    Diabetes Disease Process/Treatment  Options  Patient/caregiver able to state what happens when someone has diabetes.: somewhat  Patient/caregiver knows what type of diabetes they have.: yes  Diabetes Type : Gestational  Patient/caregiver able to identify at least three signs and symptoms of diabetes.: no  Patient able to identify at least three risk factors for diabetes.: no  Diabetes Disease Process/Treatment Options: Skills Assessment Completed: Yes  Assessment indicates:: Knowledge deficit, Instruction Needed  Area of need?: Yes    Nutrition/Healthy Eating  Method of carbohydrate measurement:: carb counting/reading labels  Patient can identify foods that impact blood sugar.: yes  Patient-identified foods:: yogurt, fruit/fruit juice, milk, soda, starchy vegetables (corn, peas, beans), starches (bread, pasta, rice, cereal), sweets  Nutrition/Healthy Eating Skills Assessment Completed:: Yes  Assessment indicates:: Knowledge deficit, Instruction Needed  Area of need?: Yes    Physical Activity/Exercise  Patient's daily activity level:: constantly moving  Patient formally exercises outside of work.: yes  Exercise Type: cycling, walking  Intensity: Moderate  Frequency: four or more times a week  Duration: 1 hour  Patient can identify forms of physical activity.: yes  Stated forms of physical activity:: any movement performed by muscles that uses energy  Patient can identify reasons why exercise/physical activity is important in diabetes management.: yes  Identified reasons:: helps insulin work better, lowers blood glucose, blood pressure, and cholesterol, improves blood circulation, strengthens heart, muscles, and bones, keeps body and joints flexible, lowers risk of heart disease and stroke, tones muscles, relieves stress  Physical Activity/Exercise Skills Assessment Completed: : Yes  Assessment indicates:: Adequate understanding  Area of need?: No    Medications  Patient is able to describe current diabetes management routine.: no  Medication Skills  Assessment Completed:: Yes  Assessment indicates:: Knowledge deficit, Instruction Needed  Area of need?: Yes    Home Blood Glucose Monitoring  Patient states that blood sugar is checked at home daily.: no  Reasons for not monitoring:: needs training  Home Blood Glucose Monitoring Skills Assessment Completed: : Yes  Assessment indicates:: Knowledge deficit, Instruction Needed  Area of need?: Yes    Acute Complications  Patient is able to identify types of acute complications: No  Acute Complications Skills Assessment Completed: : Yes  Assessment indicates:: Knowledge deficit, Instruction Needed  Area of need?: Yes    Chronic Complications  Patient can identify major chronic complications of diabetes.: no  Chronic Complications Skills Assessment Completed: : Yes  Assessment indicates:: Knowledge deficit, Instruction Needed  Area of need?: Yes    Psychosocial/Coping  Psychosocial/Coping Skills Assessment Completed: : No  Deffered due to:: Time    Diabetes Self Support Plan     Assessment Summary and Plan    Based on today's diabetes care assessment, the following areas of need were identified:      Social 6/8/2022   Access to Mass Media/Tech No   Cognitive/Behavioral Health No   Culture/Uatsdin No   Communication No   Health Literacy No      Diabetes Self-Management Skills 6/8/2022     Diabetes Disease Process/Treatment Options Yes:      Ed on what GDM is and how it evolves throughout pregnancy     Ed on insulin resistance and cho intolerance      Ed on importance of using diet, exercise and lifestyle changes to control BG during pregnancy   Nutrition/Healthy Eating   Yes: see care planning     Physical Activity/Exercise Yes:      Ed on ADA recs for physical activity and safety considerations during pregnancy   Medication Yes:      Ed on potential meds used to control BG in pregnancy   Home Blood Glucose Monitoring   Yes: see care planning      Acute Complications Yes:      Ed on hypoglycemia:  o What is  considered a low BG  o Ed on s/s of low BG and how to prevent and treat during pregnancy      Ed on GDM related complications and when to seek medical attention     Chronic Complications Yes:      Ed on long term complications r/t GDM, especially increased risk of developing Type II DM later on in life   Stressed importance of getting screened annually for diabetes w/ PCP     Today's interventions were provided through individual discussion, instruction, and written materials were provided.      Patient verbalized understanding of instruction and written materials.  Pt was able to return back demonstration of instructions today. Patient understood key points, needs reinforcement and further instruction.     Diabetes Self-Management Care Plan:    Today's Diabetes Self-Management Care Plan was developed with Maggie's input. Maggie has agreed to work toward the following goal(s) to improve his/her overall diabetes control.      Care Plan: Diabetes Management   Updates made since 5/9/2022 12:00 AM      Problem: Blood Glucose Self-Monitoring       Goal: Pt will SMBG 4x/day and bring logs to all future appointments.    Start Date: 6/8/2022   Expected End Date: 7/8/2022   Priority: High   Barriers: No Barriers Identified      Task: Sent Rx request to provider to send to patients pharmacy. Completed 6/8/2022      Task: Reviewed the importance of self-monitoring blood glucose and keeping logs. Completed 6/8/2022      Task: Instructed on how to self-monitor blood glucose using a home glucometer, how to properly dispose of used strips and lancets after use, and how to appropriately store meter and supplies. Completed 6/8/2022      Task: Provided patient with blood glucose logs, reviewed appropriate timing and frequency to SMBG, education on parameters on when to notify provider and advised patient to bring logs to all appts with PCP/Endocrinologist/Diabetes Care Specialist. Completed 6/8/2022      Task: Discussed ways to  minimize pain when monitoring blood glucose. Completed 6/8/2022      Problem: Healthy Eating       Goal: Pt will keep carbs to 30-45 grams per meal.    Start Date: 6/8/2022   Expected End Date: 7/8/2022   Priority: High   Barriers: No Barriers Identified      Task: Reviewed the sources and role of Carbohydrate, Protein, and Fat and how each nutrient impacts blood sugar. Completed 6/8/2022      Task: Provided visual examples using dry measuring cups, food models, and other familiar objects such as computer mouse, deck or cards, tennis ball etc. to help with visualization of portions. Completed 6/8/2022      Task: Explained how to count carbohydrates using the food label and the use of dry measuring cups for accurate carb counting. Completed 6/8/2022      Task: Discussed strategies for choosing healthier menu options when dining out. Completed 6/8/2022      Task: Recommended replacing beverages containing high sugar content with noncaloric/sugar free options and/or water. Completed 6/8/2022      Task: Review the importance of balancing carbohydrates with each meal using portion control techniques to count servings of carbohydrate and label reading to identify serving size and amount of total carbs per serving. Completed 6/8/2022      Task: Provided Sample plate method and reviewed the use of the plate to estimate amounts of carbohydrate per meal. Completed 6/8/2022        Follow Up Plan     Follow up if symptoms worsen or fail to improve.    Today's care plan and follow up schedule was discussed with patient.  Maggie verbalized understanding of the care plan, goals, and agrees to follow up plan.        The patient was encouraged to communicate with his/her health care provider/physician and care team regarding his/her condition(s) and treatment.  I provided the patient with my contact information today and encouraged to contact me via phone or [facility] Patient Portal as needed.     Length of Visit   Total Time: 60  Minutes

## 2022-06-12 ENCOUNTER — PATIENT MESSAGE (OUTPATIENT)
Dept: DIABETES | Facility: CLINIC | Age: 42
End: 2022-06-12
Payer: COMMERCIAL

## 2022-06-16 ENCOUNTER — PATIENT MESSAGE (OUTPATIENT)
Dept: OBSTETRICS AND GYNECOLOGY | Facility: CLINIC | Age: 42
End: 2022-06-16
Payer: COMMERCIAL

## 2022-06-28 ENCOUNTER — ROUTINE PRENATAL (OUTPATIENT)
Dept: OBSTETRICS AND GYNECOLOGY | Facility: CLINIC | Age: 42
End: 2022-06-28
Payer: COMMERCIAL

## 2022-06-28 VITALS
WEIGHT: 156.31 LBS | BODY MASS INDEX: 24.48 KG/M2 | SYSTOLIC BLOOD PRESSURE: 138 MMHG | DIASTOLIC BLOOD PRESSURE: 74 MMHG

## 2022-06-28 DIAGNOSIS — O09.292 HISTORY OF IUGR (INTRAUTERINE GROWTH RETARDATION) AND STILLBIRTH, CURRENTLY PREGNANT, SECOND TRIMESTER: Primary | ICD-10-CM

## 2022-06-28 DIAGNOSIS — E03.8 SUBCLINICAL HYPOTHYROIDISM: ICD-10-CM

## 2022-06-28 DIAGNOSIS — O24.410 DIET CONTROLLED GESTATIONAL DIABETES MELLITUS (GDM) IN THIRD TRIMESTER: ICD-10-CM

## 2022-06-28 DIAGNOSIS — O99.619 GASTROESOPHAGEAL REFLUX IN PREGNANCY: ICD-10-CM

## 2022-06-28 DIAGNOSIS — E04.9 THYROID GOITER: ICD-10-CM

## 2022-06-28 DIAGNOSIS — K21.9 GASTROESOPHAGEAL REFLUX IN PREGNANCY: ICD-10-CM

## 2022-06-28 DIAGNOSIS — O09.93 SUPERVISION OF HIGH RISK PREGNANCY IN THIRD TRIMESTER: ICD-10-CM

## 2022-06-28 DIAGNOSIS — O09.293 HISTORY OF INTRAUTERINE GROWTH RESTRICTION IN PRIOR PREGNANCY, CURRENTLY PREGNANT IN THIRD TRIMESTER: ICD-10-CM

## 2022-06-28 DIAGNOSIS — Z3A.28 28 WEEKS GESTATION OF PREGNANCY: ICD-10-CM

## 2022-06-28 DIAGNOSIS — O09.522 MULTIGRAVIDA OF ADVANCED MATERNAL AGE IN SECOND TRIMESTER: ICD-10-CM

## 2022-06-28 DIAGNOSIS — Z87.59 HISTORY OF GESTATIONAL HYPERTENSION: ICD-10-CM

## 2022-06-28 PROCEDURE — 0502F SUBSEQUENT PRENATAL CARE: CPT | Mod: CPTII,S$GLB,, | Performed by: OBSTETRICS & GYNECOLOGY

## 2022-06-28 PROCEDURE — 0502F PR SUBSEQUENT PRENATAL CARE: ICD-10-PCS | Mod: CPTII,S$GLB,, | Performed by: OBSTETRICS & GYNECOLOGY

## 2022-06-28 RX ORDER — GLUCOSAM/CHON-MSM1/C/MANG/BOSW 500-416.6
TABLET ORAL 4 TIMES DAILY
Status: ON HOLD | COMMUNITY
Start: 2022-06-10 | End: 2022-08-26 | Stop reason: HOSPADM

## 2022-06-28 RX ORDER — BLOOD-GLUCOSE METER
EACH MISCELLANEOUS
Status: ON HOLD | COMMUNITY
Start: 2022-06-08 | End: 2022-08-26 | Stop reason: HOSPADM

## 2022-06-28 NOTE — PROGRESS NOTES
Patient seen and examined.  No complaints.  + FM. Denies Vb, LOF, contractions.  Start kick counts. Glucose log reviewed, all values x 3 weeks euglycemic.  OK to start alternative 2 hour PP.  Continue daily fasting glucose.  Send log in 1 week for review. Labs ordered for 32 weeks. RTC 2 weeks for routine OB care. Growth US scheduled 7/1.

## 2022-06-29 ENCOUNTER — PATIENT MESSAGE (OUTPATIENT)
Dept: OBSTETRICS AND GYNECOLOGY | Facility: CLINIC | Age: 42
End: 2022-06-29
Payer: COMMERCIAL

## 2022-06-30 ENCOUNTER — PATIENT MESSAGE (OUTPATIENT)
Dept: MATERNAL FETAL MEDICINE | Facility: CLINIC | Age: 42
End: 2022-06-30
Payer: COMMERCIAL

## 2022-07-01 ENCOUNTER — PROCEDURE VISIT (OUTPATIENT)
Dept: MATERNAL FETAL MEDICINE | Facility: CLINIC | Age: 42
End: 2022-07-01
Payer: COMMERCIAL

## 2022-07-01 ENCOUNTER — IMMUNIZATION (OUTPATIENT)
Dept: PHARMACY | Facility: CLINIC | Age: 42
End: 2022-07-01
Payer: COMMERCIAL

## 2022-07-01 DIAGNOSIS — Z23 NEED FOR VACCINATION: Primary | ICD-10-CM

## 2022-07-01 DIAGNOSIS — Z36.89 ENCOUNTER FOR ULTRASOUND TO ASSESS FETAL GROWTH: Primary | ICD-10-CM

## 2022-07-01 PROCEDURE — 76816 US MFM PROCEDURE (VIEWPOINT): ICD-10-PCS | Mod: S$GLB,,, | Performed by: OBSTETRICS & GYNECOLOGY

## 2022-07-01 PROCEDURE — 76816 OB US FOLLOW-UP PER FETUS: CPT | Mod: S$GLB,,, | Performed by: OBSTETRICS & GYNECOLOGY

## 2022-07-13 ENCOUNTER — ROUTINE PRENATAL (OUTPATIENT)
Dept: OBSTETRICS AND GYNECOLOGY | Facility: CLINIC | Age: 42
End: 2022-07-13
Payer: COMMERCIAL

## 2022-07-13 VITALS — WEIGHT: 158.5 LBS | SYSTOLIC BLOOD PRESSURE: 132 MMHG | DIASTOLIC BLOOD PRESSURE: 82 MMHG | BODY MASS INDEX: 24.83 KG/M2

## 2022-07-13 DIAGNOSIS — Z3A.30 30 WEEKS GESTATION OF PREGNANCY: ICD-10-CM

## 2022-07-13 DIAGNOSIS — Z87.59 HISTORY OF GESTATIONAL HYPERTENSION: ICD-10-CM

## 2022-07-13 DIAGNOSIS — O09.523 AMA (ADVANCED MATERNAL AGE) MULTIGRAVIDA 35+, THIRD TRIMESTER: ICD-10-CM

## 2022-07-13 DIAGNOSIS — O09.293 HISTORY OF IUGR (INTRAUTERINE GROWTH RETARDATION) AND STILLBIRTH, CURRENTLY PREGNANT, THIRD TRIMESTER: ICD-10-CM

## 2022-07-13 DIAGNOSIS — E03.8 SUBCLINICAL HYPOTHYROIDISM: ICD-10-CM

## 2022-07-13 DIAGNOSIS — O09.93 SUPERVISION OF HIGH RISK PREGNANCY IN THIRD TRIMESTER: Primary | ICD-10-CM

## 2022-07-13 DIAGNOSIS — E04.9 THYROID GOITER: ICD-10-CM

## 2022-07-13 DIAGNOSIS — O24.410 DIET CONTROLLED GESTATIONAL DIABETES MELLITUS (GDM) IN THIRD TRIMESTER: ICD-10-CM

## 2022-07-13 PROCEDURE — 0502F PR SUBSEQUENT PRENATAL CARE: ICD-10-PCS | Mod: CPTII,S$GLB,, | Performed by: OBSTETRICS & GYNECOLOGY

## 2022-07-13 PROCEDURE — 0502F SUBSEQUENT PRENATAL CARE: CPT | Mod: CPTII,S$GLB,, | Performed by: OBSTETRICS & GYNECOLOGY

## 2022-07-13 NOTE — PROGRESS NOTES
Patient seen and examined.  No complaints.  + FM. Denies VB, LOF, contractions.  Growth scan scheduled.  Weekly PNT ordered, start at 32 weeks (Age > 40 at birth).  Glucose log in media, all values normal in range. Continue checking fasting and alternating postprandial. Repeat thyroid testing, CBC, HIV/RPR ordered for two weeks. RTC 2 weeks for routine ob.

## 2022-07-18 ENCOUNTER — PATIENT MESSAGE (OUTPATIENT)
Dept: OBSTETRICS AND GYNECOLOGY | Facility: CLINIC | Age: 42
End: 2022-07-18
Payer: COMMERCIAL

## 2022-07-22 ENCOUNTER — PATIENT MESSAGE (OUTPATIENT)
Dept: ADMINISTRATIVE | Facility: OTHER | Age: 42
End: 2022-07-22
Payer: COMMERCIAL

## 2022-07-28 ENCOUNTER — PATIENT MESSAGE (OUTPATIENT)
Dept: OBSTETRICS AND GYNECOLOGY | Facility: CLINIC | Age: 42
End: 2022-07-28

## 2022-07-28 ENCOUNTER — ROUTINE PRENATAL (OUTPATIENT)
Dept: OBSTETRICS AND GYNECOLOGY | Facility: CLINIC | Age: 42
End: 2022-07-28
Payer: COMMERCIAL

## 2022-07-28 VITALS
DIASTOLIC BLOOD PRESSURE: 82 MMHG | SYSTOLIC BLOOD PRESSURE: 136 MMHG | BODY MASS INDEX: 24.58 KG/M2 | WEIGHT: 156.94 LBS

## 2022-07-28 DIAGNOSIS — O24.410 DIET CONTROLLED GESTATIONAL DIABETES MELLITUS (GDM) IN THIRD TRIMESTER: Primary | ICD-10-CM

## 2022-07-28 PROCEDURE — 0502F PR SUBSEQUENT PRENATAL CARE: ICD-10-PCS | Mod: CPTII,S$GLB,, | Performed by: STUDENT IN AN ORGANIZED HEALTH CARE EDUCATION/TRAINING PROGRAM

## 2022-07-28 PROCEDURE — 0502F SUBSEQUENT PRENATAL CARE: CPT | Mod: CPTII,S$GLB,, | Performed by: STUDENT IN AN ORGANIZED HEALTH CARE EDUCATION/TRAINING PROGRAM

## 2022-07-28 RX ORDER — ONDANSETRON 4 MG/1
4 TABLET, FILM COATED ORAL
COMMUNITY
Start: 2022-07-25 | End: 2022-08-18

## 2022-07-28 NOTE — PROGRESS NOTES
Pt doing well.no complaints. Taking glucoses as recommended.  Denies vaginal bleeding, LOF, contractions. Reports regular fetal movement. Denies symptoms of pre E.  VS reviewed. Glucose log reviewed. Reports white coat HTN. Repeat normal  MFM growth scheduled  Labor and pre E precautions reviewed.   RTC: 2 weeks

## 2022-07-28 NOTE — PATIENT INSTRUCTIONS
[facility], Labor and Delivery is on the 6th floor of [facility]: [phone]  [email or web address]

## 2022-08-04 ENCOUNTER — PATIENT MESSAGE (OUTPATIENT)
Dept: MATERNAL FETAL MEDICINE | Facility: CLINIC | Age: 42
End: 2022-08-04
Payer: COMMERCIAL

## 2022-08-05 ENCOUNTER — PROCEDURE VISIT (OUTPATIENT)
Dept: MATERNAL FETAL MEDICINE | Facility: CLINIC | Age: 42
End: 2022-08-05
Payer: COMMERCIAL

## 2022-08-05 DIAGNOSIS — Z36.89 ENCOUNTER FOR ULTRASOUND TO ASSESS FETAL GROWTH: ICD-10-CM

## 2022-08-05 PROCEDURE — 76816 US MFM PROCEDURE (VIEWPOINT): ICD-10-PCS | Mod: S$GLB,,, | Performed by: OBSTETRICS & GYNECOLOGY

## 2022-08-05 PROCEDURE — 76819 FETAL BIOPHYS PROFIL W/O NST: CPT | Mod: S$GLB,,, | Performed by: OBSTETRICS & GYNECOLOGY

## 2022-08-05 PROCEDURE — 76816 OB US FOLLOW-UP PER FETUS: CPT | Mod: S$GLB,,, | Performed by: OBSTETRICS & GYNECOLOGY

## 2022-08-05 PROCEDURE — 76819 US MFM PROCEDURE (VIEWPOINT): ICD-10-PCS | Mod: S$GLB,,, | Performed by: OBSTETRICS & GYNECOLOGY

## 2022-08-11 ENCOUNTER — ROUTINE PRENATAL (OUTPATIENT)
Dept: OBSTETRICS AND GYNECOLOGY | Facility: CLINIC | Age: 42
End: 2022-08-11
Payer: COMMERCIAL

## 2022-08-11 ENCOUNTER — HOSPITAL ENCOUNTER (OUTPATIENT)
Dept: PERINATAL CARE | Facility: OTHER | Age: 42
Discharge: HOME | End: 2022-08-11
Attending: OBSTETRICS & GYNECOLOGY
Payer: COMMERCIAL

## 2022-08-11 VITALS — DIASTOLIC BLOOD PRESSURE: 84 MMHG | BODY MASS INDEX: 24.27 KG/M2 | WEIGHT: 155 LBS | SYSTOLIC BLOOD PRESSURE: 126 MMHG

## 2022-08-11 DIAGNOSIS — O09.293 HISTORY OF IUGR (INTRAUTERINE GROWTH RETARDATION) AND STILLBIRTH, CURRENTLY PREGNANT, THIRD TRIMESTER: ICD-10-CM

## 2022-08-11 DIAGNOSIS — O24.410 DIET CONTROLLED GESTATIONAL DIABETES MELLITUS (GDM) IN THIRD TRIMESTER: ICD-10-CM

## 2022-08-11 DIAGNOSIS — O09.93 SUPERVISION OF HIGH RISK PREGNANCY IN THIRD TRIMESTER: ICD-10-CM

## 2022-08-11 DIAGNOSIS — O99.619 GASTROESOPHAGEAL REFLUX IN PREGNANCY: Primary | ICD-10-CM

## 2022-08-11 DIAGNOSIS — Z87.59 HISTORY OF GESTATIONAL HYPERTENSION: ICD-10-CM

## 2022-08-11 DIAGNOSIS — E03.8 SUBCLINICAL HYPOTHYROIDISM: ICD-10-CM

## 2022-08-11 DIAGNOSIS — K21.9 GASTROESOPHAGEAL REFLUX IN PREGNANCY: Primary | ICD-10-CM

## 2022-08-11 DIAGNOSIS — O09.523 AMA (ADVANCED MATERNAL AGE) MULTIGRAVIDA 35+, THIRD TRIMESTER: ICD-10-CM

## 2022-08-11 DIAGNOSIS — O09.293 HISTORY OF INTRAUTERINE GROWTH RESTRICTION IN PRIOR PREGNANCY, CURRENTLY PREGNANT IN THIRD TRIMESTER: ICD-10-CM

## 2022-08-11 PROCEDURE — 0502F PR SUBSEQUENT PRENATAL CARE: ICD-10-PCS | Mod: CPTII,S$GLB,, | Performed by: OBSTETRICS & GYNECOLOGY

## 2022-08-11 PROCEDURE — 59025 FETAL NON-STRESS TEST: CPT | Mod: 26,,, | Performed by: OBSTETRICS & GYNECOLOGY

## 2022-08-11 PROCEDURE — 76815 OB US LIMITED FETUS(S): CPT | Mod: 26,,, | Performed by: OBSTETRICS & GYNECOLOGY

## 2022-08-11 PROCEDURE — 76815 PRENATAL TESTING - NST/AFI: ICD-10-PCS | Mod: 26,,, | Performed by: OBSTETRICS & GYNECOLOGY

## 2022-08-11 PROCEDURE — 59025 FETAL NON-STRESS TEST: CPT

## 2022-08-11 PROCEDURE — 99999 PR PBB SHADOW E&M-EST. PATIENT-LVL III: CPT | Mod: PBBFAC,,, | Performed by: OBSTETRICS & GYNECOLOGY

## 2022-08-11 PROCEDURE — 0502F SUBSEQUENT PRENATAL CARE: CPT | Mod: CPTII,S$GLB,, | Performed by: OBSTETRICS & GYNECOLOGY

## 2022-08-11 PROCEDURE — 76815 OB US LIMITED FETUS(S): CPT

## 2022-08-11 PROCEDURE — 99999 PR PBB SHADOW E&M-EST. PATIENT-LVL III: ICD-10-PCS | Mod: PBBFAC,,, | Performed by: OBSTETRICS & GYNECOLOGY

## 2022-08-11 PROCEDURE — 59025 PRENATAL TESTING - NST/AFI: ICD-10-PCS | Mod: 26,,, | Performed by: OBSTETRICS & GYNECOLOGY

## 2022-08-11 RX ORDER — FAMOTIDINE 10 MG/1
10 TABLET ORAL 2 TIMES DAILY PRN
COMMUNITY
End: 2022-08-26

## 2022-08-11 RX ORDER — PANTOPRAZOLE SODIUM 40 MG/1
40 TABLET, DELAYED RELEASE ORAL DAILY
Qty: 30 TABLET | Refills: 1 | Status: SHIPPED | OUTPATIENT
Start: 2022-08-11 | End: 2022-08-18

## 2022-08-11 NOTE — PROGRESS NOTES
Patient seen and examined.  + FM. Denies VB, LOF,contractions.  Notes worsening GERD, using Pepcid with minimal relief. Recommend trial of Protonix.  Continue PNT. CBC, RPR, HIV today.  Patient declines repeat thyroid testing at this time due to cost and insurance coverage.  No signs or symptoms of thyroid dysfunction. Glucose log reviewed - all values within range. RTC 1 week. Consents and GBS next week.

## 2022-08-18 ENCOUNTER — HOSPITAL ENCOUNTER (OUTPATIENT)
Dept: PERINATAL CARE | Facility: OTHER | Age: 42
Discharge: HOME | End: 2022-08-18
Attending: OBSTETRICS & GYNECOLOGY
Payer: COMMERCIAL

## 2022-08-18 ENCOUNTER — ROUTINE PRENATAL (OUTPATIENT)
Dept: OBSTETRICS AND GYNECOLOGY | Facility: CLINIC | Age: 42
End: 2022-08-18
Payer: COMMERCIAL

## 2022-08-18 VITALS
DIASTOLIC BLOOD PRESSURE: 70 MMHG | BODY MASS INDEX: 24.69 KG/M2 | SYSTOLIC BLOOD PRESSURE: 120 MMHG | WEIGHT: 157.63 LBS

## 2022-08-18 DIAGNOSIS — E04.9 THYROID GOITER: ICD-10-CM

## 2022-08-18 DIAGNOSIS — Z87.59 HISTORY OF GESTATIONAL HYPERTENSION: ICD-10-CM

## 2022-08-18 DIAGNOSIS — Z3A.35 35 WEEKS GESTATION OF PREGNANCY: ICD-10-CM

## 2022-08-18 DIAGNOSIS — O09.293 HISTORY OF IUGR (INTRAUTERINE GROWTH RETARDATION) AND STILLBIRTH, CURRENTLY PREGNANT, THIRD TRIMESTER: ICD-10-CM

## 2022-08-18 DIAGNOSIS — O09.293 HISTORY OF INTRAUTERINE GROWTH RESTRICTION IN PRIOR PREGNANCY, CURRENTLY PREGNANT IN THIRD TRIMESTER: ICD-10-CM

## 2022-08-18 DIAGNOSIS — O09.93 SUPERVISION OF HIGH RISK PREGNANCY IN THIRD TRIMESTER: ICD-10-CM

## 2022-08-18 DIAGNOSIS — E03.8 SUBCLINICAL HYPOTHYROIDISM: ICD-10-CM

## 2022-08-18 DIAGNOSIS — O09.523 AMA (ADVANCED MATERNAL AGE) MULTIGRAVIDA 35+, THIRD TRIMESTER: Primary | ICD-10-CM

## 2022-08-18 DIAGNOSIS — O24.410 DIET CONTROLLED GESTATIONAL DIABETES MELLITUS (GDM) IN THIRD TRIMESTER: ICD-10-CM

## 2022-08-18 DIAGNOSIS — K21.9 GASTROESOPHAGEAL REFLUX IN PREGNANCY: ICD-10-CM

## 2022-08-18 DIAGNOSIS — O99.619 GASTROESOPHAGEAL REFLUX IN PREGNANCY: ICD-10-CM

## 2022-08-18 DIAGNOSIS — O09.523 AMA (ADVANCED MATERNAL AGE) MULTIGRAVIDA 35+, THIRD TRIMESTER: ICD-10-CM

## 2022-08-18 PROCEDURE — 76815 OB US LIMITED FETUS(S): CPT | Mod: 26,,, | Performed by: OBSTETRICS & GYNECOLOGY

## 2022-08-18 PROCEDURE — 0502F PR SUBSEQUENT PRENATAL CARE: ICD-10-PCS | Mod: CPTII,S$GLB,, | Performed by: OBSTETRICS & GYNECOLOGY

## 2022-08-18 PROCEDURE — 87081 CULTURE SCREEN ONLY: CPT | Performed by: OBSTETRICS & GYNECOLOGY

## 2022-08-18 PROCEDURE — 0502F SUBSEQUENT PRENATAL CARE: CPT | Mod: CPTII,S$GLB,, | Performed by: OBSTETRICS & GYNECOLOGY

## 2022-08-18 PROCEDURE — 59025 FETAL NON-STRESS TEST: CPT | Mod: 26,,, | Performed by: OBSTETRICS & GYNECOLOGY

## 2022-08-18 PROCEDURE — 99999 PR PBB SHADOW E&M-EST. PATIENT-LVL III: ICD-10-PCS | Mod: PBBFAC,,, | Performed by: OBSTETRICS & GYNECOLOGY

## 2022-08-18 PROCEDURE — 76815 OB US LIMITED FETUS(S): CPT

## 2022-08-18 PROCEDURE — 99999 PR PBB SHADOW E&M-EST. PATIENT-LVL III: CPT | Mod: PBBFAC,,, | Performed by: OBSTETRICS & GYNECOLOGY

## 2022-08-18 PROCEDURE — 76815 PRENATAL TESTING - NST/AFI: ICD-10-PCS | Mod: 26,,, | Performed by: OBSTETRICS & GYNECOLOGY

## 2022-08-18 PROCEDURE — 59025 PRENATAL TESTING - NST/AFI: ICD-10-PCS | Mod: 26,,, | Performed by: OBSTETRICS & GYNECOLOGY

## 2022-08-18 PROCEDURE — 59025 FETAL NON-STRESS TEST: CPT

## 2022-08-18 NOTE — PROGRESS NOTES
Patient seen and examined.  No complaints.  + FM.  Denies VB, LOF, contractions.  PNT reassuring this morning. Continue with weekly testing.  Glucose log reviewed with all euglycemic levels.  Repeat BP today normotensive.  Denies HA, vision changes, CP, SOB, RUQ pain.  RTC 1 week for routine care. Plan for 39 week IOL.  Request placed, 9/9 [place].

## 2022-08-19 ENCOUNTER — PATIENT MESSAGE (OUTPATIENT)
Dept: OBSTETRICS AND GYNECOLOGY | Facility: CLINIC | Age: 42
End: 2022-08-19
Payer: COMMERCIAL

## 2022-08-20 LAB — BACTERIA SPEC AEROBE CULT: NORMAL

## 2022-08-25 ENCOUNTER — PATIENT MESSAGE (OUTPATIENT)
Dept: OBSTETRICS AND GYNECOLOGY | Facility: OTHER | Age: 42
End: 2022-08-25
Payer: COMMERCIAL

## 2022-08-25 ENCOUNTER — ROUTINE PRENATAL (OUTPATIENT)
Dept: OBSTETRICS AND GYNECOLOGY | Facility: CLINIC | Age: 42
End: 2022-08-25
Payer: COMMERCIAL

## 2022-08-25 ENCOUNTER — HOSPITAL ENCOUNTER (INPATIENT)
Facility: OTHER | Age: 42
LOS: 2 days | Discharge: HOME | End: 2022-08-27
Attending: OBSTETRICS & GYNECOLOGY | Admitting: OBSTETRICS & GYNECOLOGY
Payer: COMMERCIAL

## 2022-08-25 ENCOUNTER — ANESTHESIA (OUTPATIENT)
Dept: OBSTETRICS AND GYNECOLOGY | Facility: OTHER | Age: 42
End: 2022-08-25
Payer: COMMERCIAL

## 2022-08-25 ENCOUNTER — HOSPITAL ENCOUNTER (OUTPATIENT)
Dept: PERINATAL CARE | Facility: OTHER | Age: 42
Discharge: HOME | End: 2022-08-25
Attending: OBSTETRICS & GYNECOLOGY
Payer: COMMERCIAL

## 2022-08-25 ENCOUNTER — ANESTHESIA EVENT (OUTPATIENT)
Dept: OBSTETRICS AND GYNECOLOGY | Facility: OTHER | Age: 42
End: 2022-08-25
Payer: COMMERCIAL

## 2022-08-25 VITALS
DIASTOLIC BLOOD PRESSURE: 86 MMHG | BODY MASS INDEX: 24.45 KG/M2 | SYSTOLIC BLOOD PRESSURE: 144 MMHG | WEIGHT: 156.06 LBS

## 2022-08-25 DIAGNOSIS — O09.293 HISTORY OF IUGR (INTRAUTERINE GROWTH RETARDATION) AND STILLBIRTH, CURRENTLY PREGNANT, THIRD TRIMESTER: ICD-10-CM

## 2022-08-25 DIAGNOSIS — E03.8 SUBCLINICAL HYPOTHYROIDISM: ICD-10-CM

## 2022-08-25 DIAGNOSIS — O24.410 DIET CONTROLLED GESTATIONAL DIABETES MELLITUS (GDM) IN THIRD TRIMESTER: ICD-10-CM

## 2022-08-25 DIAGNOSIS — O99.619 GASTROESOPHAGEAL REFLUX IN PREGNANCY: ICD-10-CM

## 2022-08-25 DIAGNOSIS — K21.9 GASTROESOPHAGEAL REFLUX IN PREGNANCY: ICD-10-CM

## 2022-08-25 DIAGNOSIS — Z3A.36 36 WEEKS GESTATION OF PREGNANCY: ICD-10-CM

## 2022-08-25 DIAGNOSIS — O09.523 AMA (ADVANCED MATERNAL AGE) MULTIGRAVIDA 35+, THIRD TRIMESTER: ICD-10-CM

## 2022-08-25 DIAGNOSIS — Z37.9 NORMAL LABOR: ICD-10-CM

## 2022-08-25 DIAGNOSIS — O13.3 GESTATIONAL HYPERTENSION, THIRD TRIMESTER: Primary | ICD-10-CM

## 2022-08-25 DIAGNOSIS — O09.293 HISTORY OF INTRAUTERINE GROWTH RESTRICTION IN PRIOR PREGNANCY, CURRENTLY PREGNANT IN THIRD TRIMESTER: ICD-10-CM

## 2022-08-25 DIAGNOSIS — O09.93 SUPERVISION OF HIGH RISK PREGNANCY IN THIRD TRIMESTER: ICD-10-CM

## 2022-08-25 PROBLEM — E05.90 SUBCLINICAL HYPERTHYROIDISM: Status: ACTIVE | Noted: 2022-08-25

## 2022-08-25 PROBLEM — O24.419 GESTATIONAL DIABETES MELLITUS (GDM) AFFECTING SECOND PREGNANCY: Status: ACTIVE | Noted: 2022-08-25

## 2022-08-25 LAB
BASOPHILS # BLD AUTO: 0.03 K/UL (ref 0–0.2)
BASOPHILS NFR BLD: 0.3 % (ref 0–1.9)
DIFFERENTIAL METHOD: ABNORMAL
EOSINOPHIL # BLD AUTO: 0 K/UL (ref 0–0.5)
EOSINOPHIL NFR BLD: 0.2 % (ref 0–8)
ERYTHROCYTE [DISTWIDTH] IN BLOOD BY AUTOMATED COUNT: 12.8 % (ref 11.5–14.5)
HCT VFR BLD AUTO: 33.4 % (ref 37–48.5)
HGB BLD-MCNC: 11.7 G/DL (ref 12–16)
IMM GRANULOCYTES # BLD AUTO: 0.01 K/UL (ref 0–0.04)
IMM GRANULOCYTES NFR BLD AUTO: 0.1 % (ref 0–0.5)
LYMPHOCYTES # BLD AUTO: 2.2 K/UL (ref 1–4.8)
LYMPHOCYTES NFR BLD: 25.2 % (ref 18–48)
MCH RBC QN AUTO: 31.4 PG (ref 27–31)
MCHC RBC AUTO-ENTMCNC: 35 G/DL (ref 32–36)
MCV RBC AUTO: 90 FL (ref 82–98)
MONOCYTES # BLD AUTO: 0.7 K/UL (ref 0.3–1)
MONOCYTES NFR BLD: 7.9 % (ref 4–15)
NEUTROPHILS # BLD AUTO: 5.7 K/UL (ref 1.8–7.7)
NEUTROPHILS NFR BLD: 66.3 % (ref 38–73)
NRBC BLD-RTO: 0 /100 WBC
PLATELET # BLD AUTO: 265 K/UL (ref 150–450)
PMV BLD AUTO: 10.8 FL (ref 9.2–12.9)
RBC # BLD AUTO: 3.73 M/UL (ref 4–5.4)
WBC # BLD AUTO: 8.65 K/UL (ref 3.9–12.7)

## 2022-08-25 PROCEDURE — 85025 COMPLETE CBC W/AUTO DIFF WBC: CPT | Mod: 91 | Performed by: STUDENT IN AN ORGANIZED HEALTH CARE EDUCATION/TRAINING PROGRAM

## 2022-08-25 PROCEDURE — 59025 PRENATAL TESTING - NST/AFI: ICD-10-PCS | Mod: 26,,, | Performed by: OBSTETRICS & GYNECOLOGY

## 2022-08-25 PROCEDURE — 76815 OB US LIMITED FETUS(S): CPT

## 2022-08-25 PROCEDURE — 59025 FETAL NON-STRESS TEST: CPT

## 2022-08-25 PROCEDURE — 99999 PR PBB SHADOW E&M-EST. PATIENT-LVL III: CPT | Mod: PBBFAC,,, | Performed by: OBSTETRICS & GYNECOLOGY

## 2022-08-25 PROCEDURE — 76815 PRENATAL TESTING - NST/AFI: ICD-10-PCS | Mod: 26,,, | Performed by: OBSTETRICS & GYNECOLOGY

## 2022-08-25 PROCEDURE — 0502F PR SUBSEQUENT PRENATAL CARE: ICD-10-PCS | Mod: CPTII,S$GLB,, | Performed by: OBSTETRICS & GYNECOLOGY

## 2022-08-25 PROCEDURE — 0502F SUBSEQUENT PRENATAL CARE: CPT | Mod: CPTII,S$GLB,, | Performed by: OBSTETRICS & GYNECOLOGY

## 2022-08-25 PROCEDURE — 59409 PRA ETRICAL CARE,VAG DELIV ONLY: ICD-10-PCS | Mod: QY,,, | Performed by: ANESTHESIOLOGY

## 2022-08-25 PROCEDURE — C1751 CATH, INF, PER/CENT/MIDLINE: HCPCS | Performed by: ANESTHESIOLOGY

## 2022-08-25 PROCEDURE — 59025 FETAL NON-STRESS TEST: CPT | Mod: 26,,, | Performed by: OBSTETRICS & GYNECOLOGY

## 2022-08-25 PROCEDURE — 62326 NJX INTERLAMINAR LMBR/SAC: CPT | Performed by: STUDENT IN AN ORGANIZED HEALTH CARE EDUCATION/TRAINING PROGRAM

## 2022-08-25 PROCEDURE — 80053 COMPREHEN METABOLIC PANEL: CPT | Mod: 91 | Performed by: STUDENT IN AN ORGANIZED HEALTH CARE EDUCATION/TRAINING PROGRAM

## 2022-08-25 PROCEDURE — 87340 HEPATITIS B SURFACE AG IA: CPT | Performed by: STUDENT IN AN ORGANIZED HEALTH CARE EDUCATION/TRAINING PROGRAM

## 2022-08-25 PROCEDURE — 99999 PR PBB SHADOW E&M-EST. PATIENT-LVL III: ICD-10-PCS | Mod: PBBFAC,,, | Performed by: OBSTETRICS & GYNECOLOGY

## 2022-08-25 PROCEDURE — 25000003 PHARM REV CODE 250: Performed by: STUDENT IN AN ORGANIZED HEALTH CARE EDUCATION/TRAINING PROGRAM

## 2022-08-25 PROCEDURE — 11000001 HC ACUTE MED/SURG PRIVATE ROOM

## 2022-08-25 PROCEDURE — 86762 RUBELLA ANTIBODY: CPT | Performed by: STUDENT IN AN ORGANIZED HEALTH CARE EDUCATION/TRAINING PROGRAM

## 2022-08-25 PROCEDURE — 27200710 HC EPIDURAL INFUSION PUMP SET: Performed by: ANESTHESIOLOGY

## 2022-08-25 PROCEDURE — 76815 OB US LIMITED FETUS(S): CPT | Mod: 26,,, | Performed by: OBSTETRICS & GYNECOLOGY

## 2022-08-25 PROCEDURE — 59409 OBSTETRICAL CARE: CPT | Mod: QY,,, | Performed by: ANESTHESIOLOGY

## 2022-08-25 RX ORDER — CARBOPROST TROMETHAMINE 250 UG/ML
250 INJECTION, SOLUTION INTRAMUSCULAR
Status: CANCELLED | OUTPATIENT
Start: 2022-08-26

## 2022-08-25 RX ORDER — METHYLERGONOVINE MALEATE 0.2 MG/ML
200 INJECTION INTRAVENOUS
Status: CANCELLED | OUTPATIENT
Start: 2022-08-26

## 2022-08-25 RX ORDER — FENTANYL/BUPIVACAINE/NS/PF 2MCG/ML-.1
PLASTIC BAG, INJECTION (ML) INJECTION
Status: COMPLETED
Start: 2022-08-25 | End: 2022-08-25

## 2022-08-25 RX ORDER — METHYLERGONOVINE MALEATE 0.2 MG/ML
INJECTION INTRAVENOUS
Status: DISCONTINUED
Start: 2022-08-25 | End: 2022-08-26 | Stop reason: WASHOUT

## 2022-08-25 RX ORDER — LIDOCAINE HYDROCHLORIDE AND EPINEPHRINE 15; 5 MG/ML; UG/ML
INJECTION, SOLUTION EPIDURAL
Status: DISCONTINUED | OUTPATIENT
Start: 2022-08-25 | End: 2022-08-26

## 2022-08-25 RX ORDER — MISOPROSTOL 200 UG/1
TABLET ORAL
Status: DISCONTINUED
Start: 2022-08-25 | End: 2022-08-26 | Stop reason: WASHOUT

## 2022-08-25 RX ORDER — DIPHENOXYLATE HYDROCHLORIDE AND ATROPINE SULFATE 2.5; .025 MG/1; MG/1
1 TABLET ORAL 4 TIMES DAILY PRN
Status: CANCELLED | OUTPATIENT
Start: 2022-08-26

## 2022-08-25 RX ORDER — OXYTOCIN/RINGER'S LACTATE 30/500 ML
95 PLASTIC BAG, INJECTION (ML) INTRAVENOUS ONCE
Status: DISCONTINUED | OUTPATIENT
Start: 2022-08-26 | End: 2022-08-26

## 2022-08-25 RX ORDER — CEFAZOLIN SODIUM 2 G/50ML
2 SOLUTION INTRAVENOUS ONCE AS NEEDED
Status: DISCONTINUED | OUTPATIENT
Start: 2022-08-26 | End: 2022-08-26

## 2022-08-25 RX ORDER — MISOPROSTOL 200 UG/1
800 TABLET ORAL
Status: CANCELLED | OUTPATIENT
Start: 2022-08-26

## 2022-08-25 RX ORDER — SODIUM CHLORIDE, SODIUM LACTATE, POTASSIUM CHLORIDE, CALCIUM CHLORIDE 600; 310; 30; 20 MG/100ML; MG/100ML; MG/100ML; MG/100ML
INJECTION, SOLUTION INTRAVENOUS CONTINUOUS
Status: DISCONTINUED | OUTPATIENT
Start: 2022-08-26 | End: 2022-08-26

## 2022-08-25 RX ORDER — FENTANYL/BUPIVACAINE/NS/PF 2MCG/ML-.1
PLASTIC BAG, INJECTION (ML) INJECTION
Status: DISCONTINUED | OUTPATIENT
Start: 2022-08-25 | End: 2022-08-26

## 2022-08-25 RX ORDER — PROCHLORPERAZINE EDISYLATE 5 MG/ML
5 INJECTION INTRAMUSCULAR; INTRAVENOUS EVERY 6 HOURS PRN
Status: DISCONTINUED | OUTPATIENT
Start: 2022-08-26 | End: 2022-08-26

## 2022-08-25 RX ORDER — LIDOCAINE HYDROCHLORIDE 10 MG/ML
10 INJECTION INFILTRATION; PERINEURAL ONCE AS NEEDED
Status: DISCONTINUED | OUTPATIENT
Start: 2022-08-26 | End: 2022-08-26

## 2022-08-25 RX ORDER — OXYTOCIN/RINGER'S LACTATE 30/500 ML
334 PLASTIC BAG, INJECTION (ML) INTRAVENOUS ONCE
Status: DISCONTINUED | OUTPATIENT
Start: 2022-08-25 | End: 2022-08-26

## 2022-08-25 RX ORDER — CALCIUM CARBONATE 200(500)MG
500 TABLET,CHEWABLE ORAL 3 TIMES DAILY PRN
Status: DISCONTINUED | OUTPATIENT
Start: 2022-08-26 | End: 2022-08-26

## 2022-08-25 RX ORDER — ONDANSETRON 8 MG/1
8 TABLET, ORALLY DISINTEGRATING ORAL EVERY 8 HOURS PRN
Status: DISCONTINUED | OUTPATIENT
Start: 2022-08-26 | End: 2022-08-26

## 2022-08-25 RX ORDER — SODIUM CHLORIDE 9 MG/ML
INJECTION, SOLUTION INTRAVENOUS
Status: DISCONTINUED | OUTPATIENT
Start: 2022-08-26 | End: 2022-08-26

## 2022-08-25 RX ORDER — SIMETHICONE 80 MG
1 TABLET,CHEWABLE ORAL 4 TIMES DAILY PRN
Status: DISCONTINUED | OUTPATIENT
Start: 2022-08-26 | End: 2022-08-26

## 2022-08-25 RX ORDER — TRANEXAMIC ACID 100 MG/ML
1000 INJECTION, SOLUTION INTRAVENOUS ONCE AS NEEDED
Status: DISCONTINUED | OUTPATIENT
Start: 2022-08-25 | End: 2022-08-26

## 2022-08-25 RX ADMIN — Medication 1 ML: at 11:08

## 2022-08-25 RX ADMIN — LIDOCAINE HYDROCHLORIDE,EPINEPHRINE BITARTRATE 3 ML: 15; .005 INJECTION, SOLUTION EPIDURAL; INFILTRATION; INTRACAUDAL; PERINEURAL at 11:08

## 2022-08-25 RX ADMIN — Medication 10 ML/HR: at 11:08

## 2022-08-25 NOTE — PROGRESS NOTES
Patient seen and examined.  Reports overall feeling of not feeling well for the past day.  Notes increased dyspnea with exertion. Glucose log reviewed with all values in range. Now has two elevated Bps in clinic. Will send to OB ED for labs and serial BP.  If workup negative for PreE with SF will schedule IOL for tonight at 37 weeks for GHTN vs PreE withOUT severe features.     Patient continues to report fetal movement.  Denies VB, LOF, contractions.  Cervix favorable. GBS negative.

## 2022-08-26 ENCOUNTER — PATIENT MESSAGE (OUTPATIENT)
Dept: OBSTETRICS AND GYNECOLOGY | Facility: CLINIC | Age: 42
End: 2022-08-26
Payer: COMMERCIAL

## 2022-08-26 PROBLEM — Z37.9 NORMAL LABOR: Status: RESOLVED | Noted: 2022-08-25 | Resolved: 2022-08-26

## 2022-08-26 LAB
ALBUMIN SERPL BCP-MCNC: 3.1 G/DL (ref 3.5–5.2)
ALP SERPL-CCNC: 159 U/L (ref 55–135)
ALT SERPL W/O P-5'-P-CCNC: 11 U/L (ref 10–44)
ANION GAP SERPL CALC-SCNC: 12 MMOL/L (ref 8–16)
AST SERPL-CCNC: 19 U/L (ref 10–40)
BASOPHILS # BLD AUTO: 0.01 K/UL (ref 0–0.2)
BASOPHILS NFR BLD: 0.1 % (ref 0–1.9)
BILIRUB SERPL-MCNC: 0.3 MG/DL (ref 0.1–1)
BUN SERPL-MCNC: 8 MG/DL (ref 6–20)
CALCIUM SERPL-MCNC: 9.4 MG/DL (ref 8.7–10.5)
CHLORIDE SERPL-SCNC: 102 MMOL/L (ref 95–110)
CO2 SERPL-SCNC: 18 MMOL/L (ref 23–29)
CREAT SERPL-MCNC: 0.7 MG/DL (ref 0.5–1.4)
DIFFERENTIAL METHOD: ABNORMAL
EOSINOPHIL # BLD AUTO: 0 K/UL (ref 0–0.5)
EOSINOPHIL NFR BLD: 0.3 % (ref 0–8)
ERYTHROCYTE [DISTWIDTH] IN BLOOD BY AUTOMATED COUNT: 12.9 % (ref 11.5–14.5)
EST. GFR  (NO RACE VARIABLE): >60 ML/MIN/1.73 M^2
GLUCOSE SERPL-MCNC: 108 MG/DL (ref 70–110)
HBV SURFACE AG SERPL QL IA: NEGATIVE
HCT VFR BLD AUTO: 27.9 % (ref 37–48.5)
HGB BLD-MCNC: 9.7 G/DL (ref 12–16)
IMM GRANULOCYTES # BLD AUTO: 0.04 K/UL (ref 0–0.04)
IMM GRANULOCYTES NFR BLD AUTO: 0.4 % (ref 0–0.5)
LYMPHOCYTES # BLD AUTO: 1.4 K/UL (ref 1–4.8)
LYMPHOCYTES NFR BLD: 14.1 % (ref 18–48)
MCH RBC QN AUTO: 32 PG (ref 27–31)
MCHC RBC AUTO-ENTMCNC: 34.8 G/DL (ref 32–36)
MCV RBC AUTO: 92 FL (ref 82–98)
MONOCYTES # BLD AUTO: 0.8 K/UL (ref 0.3–1)
MONOCYTES NFR BLD: 8.2 % (ref 4–15)
NEUTROPHILS # BLD AUTO: 7.5 K/UL (ref 1.8–7.7)
NEUTROPHILS NFR BLD: 76.9 % (ref 38–73)
NRBC BLD-RTO: 0 /100 WBC
PLATELET # BLD AUTO: 198 K/UL (ref 150–450)
PMV BLD AUTO: 10.8 FL (ref 9.2–12.9)
POTASSIUM SERPL-SCNC: 3.9 MMOL/L (ref 3.5–5.1)
PROT SERPL-MCNC: 6.4 G/DL (ref 6–8.4)
RBC # BLD AUTO: 3.03 M/UL (ref 4–5.4)
SODIUM SERPL-SCNC: 132 MMOL/L (ref 136–145)
WBC # BLD AUTO: 9.79 K/UL (ref 3.9–12.7)

## 2022-08-26 PROCEDURE — 11000001 HC ACUTE MED/SURG PRIVATE ROOM

## 2022-08-26 PROCEDURE — 25000003 PHARM REV CODE 250

## 2022-08-26 PROCEDURE — 72100002 HC LABOR CARE, 1ST 8 HOURS

## 2022-08-26 PROCEDURE — 59400 PR FULL ROUT OBSTE CARE,VAGINAL DELIV: ICD-10-PCS | Mod: ICN,,, | Performed by: OBSTETRICS & GYNECOLOGY

## 2022-08-26 PROCEDURE — 85025 COMPLETE CBC W/AUTO DIFF WBC: CPT | Performed by: OBSTETRICS & GYNECOLOGY

## 2022-08-26 PROCEDURE — 72200004 HC VAGINAL DELIVERY LEVEL I

## 2022-08-26 PROCEDURE — 59400 OBSTETRICAL CARE: CPT | Mod: ICN,,, | Performed by: OBSTETRICS & GYNECOLOGY

## 2022-08-26 PROCEDURE — 36415 COLL VENOUS BLD VENIPUNCTURE: CPT | Performed by: OBSTETRICS & GYNECOLOGY

## 2022-08-26 RX ORDER — IBUPROFEN 600 MG/1
600 TABLET ORAL EVERY 6 HOURS
Status: DISCONTINUED | OUTPATIENT
Start: 2022-08-26 | End: 2022-08-27 | Stop reason: HOSPADM

## 2022-08-26 RX ORDER — DIPHENHYDRAMINE HYDROCHLORIDE 50 MG/ML
25 INJECTION INTRAMUSCULAR; INTRAVENOUS EVERY 4 HOURS PRN
Status: DISCONTINUED | OUTPATIENT
Start: 2022-08-26 | End: 2022-08-27 | Stop reason: HOSPADM

## 2022-08-26 RX ORDER — SIMETHICONE 80 MG
1 TABLET,CHEWABLE ORAL EVERY 6 HOURS PRN
Status: DISCONTINUED | OUTPATIENT
Start: 2022-08-26 | End: 2022-08-27 | Stop reason: HOSPADM

## 2022-08-26 RX ORDER — HYDROCORTISONE 25 MG/G
CREAM TOPICAL 3 TIMES DAILY PRN
Status: DISCONTINUED | OUTPATIENT
Start: 2022-08-26 | End: 2022-08-27 | Stop reason: HOSPADM

## 2022-08-26 RX ORDER — ONDANSETRON 8 MG/1
8 TABLET, ORALLY DISINTEGRATING ORAL EVERY 8 HOURS PRN
Status: DISCONTINUED | OUTPATIENT
Start: 2022-08-26 | End: 2022-08-27 | Stop reason: HOSPADM

## 2022-08-26 RX ORDER — DOCUSATE SODIUM 100 MG/1
200 CAPSULE, LIQUID FILLED ORAL 2 TIMES DAILY
Qty: 60 CAPSULE | Refills: 0 | Status: SHIPPED | OUTPATIENT
Start: 2022-08-26 | End: 2023-08-25

## 2022-08-26 RX ORDER — ACETAMINOPHEN 325 MG/1
650 TABLET ORAL EVERY 6 HOURS PRN
Status: DISCONTINUED | OUTPATIENT
Start: 2022-08-26 | End: 2022-08-27 | Stop reason: HOSPADM

## 2022-08-26 RX ORDER — OXYTOCIN/RINGER'S LACTATE 30/500 ML
95 PLASTIC BAG, INJECTION (ML) INTRAVENOUS ONCE
Status: DISCONTINUED | OUTPATIENT
Start: 2022-08-26 | End: 2022-08-27 | Stop reason: HOSPADM

## 2022-08-26 RX ORDER — DOCUSATE SODIUM 100 MG/1
200 CAPSULE, LIQUID FILLED ORAL 2 TIMES DAILY PRN
Status: DISCONTINUED | OUTPATIENT
Start: 2022-08-26 | End: 2022-08-27 | Stop reason: HOSPADM

## 2022-08-26 RX ORDER — PROCHLORPERAZINE EDISYLATE 5 MG/ML
5 INJECTION INTRAMUSCULAR; INTRAVENOUS EVERY 6 HOURS PRN
Status: DISCONTINUED | OUTPATIENT
Start: 2022-08-26 | End: 2022-08-27 | Stop reason: HOSPADM

## 2022-08-26 RX ORDER — HYDROCODONE BITARTRATE AND ACETAMINOPHEN 10; 325 MG/1; MG/1
1 TABLET ORAL EVERY 4 HOURS PRN
Status: DISCONTINUED | OUTPATIENT
Start: 2022-08-26 | End: 2022-08-27 | Stop reason: HOSPADM

## 2022-08-26 RX ORDER — SODIUM CHLORIDE 0.9 % (FLUSH) 0.9 %
10 SYRINGE (ML) INJECTION EVERY 6 HOURS PRN
Status: DISCONTINUED | OUTPATIENT
Start: 2022-08-26 | End: 2022-08-27 | Stop reason: HOSPADM

## 2022-08-26 RX ORDER — HYDROCODONE BITARTRATE AND ACETAMINOPHEN 5; 325 MG/1; MG/1
1 TABLET ORAL EVERY 4 HOURS PRN
Status: DISCONTINUED | OUTPATIENT
Start: 2022-08-26 | End: 2022-08-27 | Stop reason: HOSPADM

## 2022-08-26 RX ORDER — IBUPROFEN 600 MG/1
600 TABLET ORAL EVERY 6 HOURS PRN
Qty: 30 TABLET | Refills: 1 | Status: SHIPPED | OUTPATIENT
Start: 2022-08-26 | End: 2023-08-25

## 2022-08-26 RX ORDER — DIPHENHYDRAMINE HCL 25 MG
25 CAPSULE ORAL EVERY 4 HOURS PRN
Status: DISCONTINUED | OUTPATIENT
Start: 2022-08-26 | End: 2022-08-27 | Stop reason: HOSPADM

## 2022-08-26 RX ORDER — PRENATAL WITH FERROUS FUM AND FOLIC ACID 3080; 920; 120; 400; 22; 1.84; 3; 20; 10; 1; 12; 200; 27; 25; 2 [IU]/1; [IU]/1; MG/1; [IU]/1; MG/1; MG/1; MG/1; MG/1; MG/1; MG/1; UG/1; MG/1; MG/1; MG/1; MG/1
1 TABLET ORAL DAILY
Status: DISCONTINUED | OUTPATIENT
Start: 2022-08-26 | End: 2022-08-27 | Stop reason: HOSPADM

## 2022-08-26 RX ADMIN — PRENATAL VIT W/ FE FUMARATE-FA TAB 27-0.8 MG 1 TABLET: 27-0.8 TAB at 08:08

## 2022-08-26 RX ADMIN — IBUPROFEN 600 MG: 600 TABLET ORAL at 12:08

## 2022-08-26 RX ADMIN — IBUPROFEN 600 MG: 600 TABLET ORAL at 05:08

## 2022-08-26 RX ADMIN — IBUPROFEN 600 MG: 600 TABLET ORAL at 06:08

## 2022-08-26 RX ADMIN — DOCUSATE SODIUM 200 MG: 100 CAPSULE, LIQUID FILLED ORAL at 08:08

## 2022-08-26 NOTE — ANESTHESIA PREPROCEDURE EVALUATION
Maggie Krishnan is a 42 y.o. female , 36w6d presenting with ROM and contractions. Patient requesting epidural immediately. She has gHTN.      OB History    Para Term  AB Living   2 1 1         SAB IAB Ectopic Multiple Live Births                  # Outcome Date GA Lbr Nick/2nd Weight Sex Delivery Anes PTL Lv   2 Current            1 Term 17 37w0d  2.126 kg (4 lb 11 oz) F Vag-Spont          Wt Readings from Last 1 Encounters:   22 1309 70.8 kg (156 lb)       BP Readings from Last 3 Encounters:   22 117/67   22 (!) 144/86   22 120/70       Patient Active Problem List   Diagnosis    Calculus of kidney    Goiter    Gestational hypertension, third trimester    Normal labor    Multigravida of advanced maternal age in third trimester    Subclinical hyperthyroidism    Gestational diabetes mellitus (GDM) affecting second pregnancy       Past Surgical History:   Procedure Laterality Date    FOOT SURGERY Left 2000    WISDOM TOOTH EXTRACTION         Social History     Socioeconomic History    Marital status:    Tobacco Use    Smoking status: Never Smoker    Smokeless tobacco: Never Used   Substance and Sexual Activity    Alcohol use: Never    Drug use: Never    Sexual activity: Yes     Partners: Male         Chemistry        Component Value Date/Time     (L) 2022 1203    K 4.1 2022 1203     2022 1203    CO2 21 (L) 2022 1203    BUN 6 2022 1203    CREATININE 0.8 2022 1203    GLU 80 2022 1203        Component Value Date/Time    CALCIUM 8.9 2022 1203    ALKPHOS 156 (H) 2022 1203    AST 18 2022 1203    ALT 12 2022 1203    BILITOT 0.4 2022 1203    ESTGFRAFRICA >60 2022 1349    EGFRNONAA >60 2022 1349            Lab Results   Component Value Date    WBC 8.65 2022    HGB  11.7 (L) 08/25/2022    HCT 33.4 (L) 08/25/2022    MCV 90 08/25/2022     08/25/2022       No results for input(s): PT, INR, PROTIME, APTT in the last 72 hours.                  Pre-op Assessment    I have reviewed the Patient Summary Reports.     I have reviewed the Nursing Notes. I have reviewed the NPO Status.   I have reviewed the Medications.     Review of Systems  Anesthesia Hx:  No previous Anesthesia  Denies Family Hx of Anesthesia complications.    Hematology/Oncology:  Hematology Normal   Oncology Normal     EENT/Dental:EENT/Dental Normal   Cardiovascular:   Hypertension    Pulmonary:  Pulmonary Normal    Renal/:  Renal/ Normal     Hepatic/GI:  Hepatic/GI Normal    Neurological:  Neurology Normal        Physical Exam  General: Well nourished, Cooperative, Alert and Oriented    Airway:  Mallampati: II / II  Mouth Opening: Normal  Tongue: Normal    Dental:  Intact        Anesthesia Plan  Type of Anesthesia, risks & benefits discussed:    Anesthesia Type: Epidural, Spinal, CSE, Gen ETT  Intra-op Monitoring Plan: Standard ASA Monitors  Post Op Pain Control Plan: intrathecal opioid and epidural analgesia  Induction:  IV  Airway Plan: Direct and Video  Informed Consent: Informed consent signed with the Patient and all parties understand the risks and agree with anesthesia plan.  All questions answered.   ASA Score: 2  Day of Surgery Review of History & Physical: H&P Update referred to the surgeon/provider.    Ready For Surgery From Anesthesia Perspective.     .

## 2022-08-26 NOTE — H&P
HISTORY AND PHYSICAL                                                OBSTETRICS          Subjective:       Maggie Krishnan is a 42 y.o.  female with IUP at 36w6d weeks gestation who c/o contractions and leakage of fluids. Contractions have been occuring Q2-3 min and have increased in intensity. Patient requesting epidural immediately. Reports gush of clear fluids at 2230. Patient is a direct admit in the setting of presenting in labor and scheduled for induction of labor at [facility] 2/2 [facility].     This IUP is complicated by gHTN, A1GDM, subclinical hyperthyroidism, and AMA.  Patient reports contractions, denies vaginal bleeding, reports LOF.   Fetal Movement: normal.      PMHx:   Past Medical History:   Diagnosis Date    History of gestational hypertension     History of IUGR (intrauterine growth retardation) and stillbirth, currently pregnant 3/11/2022       PSHx:   Past Surgical History:   Procedure Laterality Date    FOOT SURGERY Left     WISDOM TOOTH EXTRACTION         All:   Review of patient's allergies indicates:   Allergen Reactions    Alcohol     Sulfa (sulfonamide antibiotics) Nausea And Vomiting       Meds:   Medications Prior to Admission   Medication Sig Dispense Refill Last Dose    aspirin 81 MG Chew Take 1 tablet (81 mg total) by mouth once daily. 90 tablet 3     blood sugar diagnostic Strp 1 strip by Misc.(Non-Drug; Combo Route) route 4 (four) times daily. 200 strip 3     blood-glucose meter kit Use as instructed 1 each 0     famotidine (PEPCID) 10 MG tablet Take 10 mg by mouth 2 (two) times daily as needed for Heartburn.       lancets Misc 1 Device by Misc.(Non-Drug; Combo Route) route 4 (four) times daily. 200 each 3     loratadine (CLARITIN ORAL) Take by mouth as needed.       prenatal vit,yareli 74-iron-folic (PRENATAL LOW IRON) 27 mg iron- 1 mg Tab Take by mouth.       sars-cov-2, covid-19, (MODERNA COVID-19) 50 mcg/0.25 ml injection (BOOSTER) Inject into the muscle. 0.25  mL 0     TRUE METRIX GLUCOSE METER Misc use as directed       TRUEPLUS LANCETS 30 gauge Misc 4 (four) times daily. Test          SH:   Social History     Socioeconomic History    Marital status:    Tobacco Use    Smoking status: Never Smoker    Smokeless tobacco: Never Used   Substance and Sexual Activity    Alcohol use: Never    Drug use: Never    Sexual activity: Yes     Partners: Male       FH:   Family History   Problem Relation Age of Onset    Breast cancer Neg Hx     Colon cancer Neg Hx     Ovarian cancer Neg Hx        OBHx:   OB History    Para Term  AB Living   2 1 1 0 0 0   SAB IAB Ectopic Multiple Live Births   0 0 0 0 0      # Outcome Date GA Lbr Nick/2nd Weight Sex Delivery Anes PTL Lv   2 Current            1 Term 17 37w0d  2.126 kg (4 lb 11 oz) F Vag-Spont          Objective:       There were no vitals taken for this visit.    There were no vitals filed for this visit.    General:   alert, appears stated age and cooperative   Lungs:   non-labored breathing   Heart:  Regular rat    Abdomen:  soft, non-tender; bowel sounds normal; no masses,  no organomegaly   Extremities negative edema, negative erythema   FHT: 120 bpm, mod variability, + accels, - decels; Cat 1 (reassuring)                 TOCO: Q 2 minutes   Presentations: cephalic   Cervix:     Dilation: 6 cm    Effacement: 90%    Station:  -1    Consistency: soft    Position: anterior   EFW by Leopold's: 5 lbs     Lab Review  Blood Type B POS  GBBS: negative  Rubella: Immune  RPR: NR  HIV: negative  HepB: negative       Assessment:       36w6d weeks gestation, labor.     Active Hospital Problems    Diagnosis  POA    *Normal labor [O80, Z37.9]  Not Applicable    Gestational hypertension, third trimester [O13.3]  Yes    Multigravida of advanced maternal age in third trimester [O09.523]  Yes    Subclinical hyperthyroidism [E05.90]  Yes    Gestational diabetes mellitus (GDM) affecting second pregnancy [O24.419]   Yes      Resolved Hospital Problems   No resolved problems to display.          Plan:     1. IOL:   - Risks, benefits, alternatives and possible complications have been discussed in detail with the patient.   - Consents signed and to chart  - Admit to Labor and Delivery unit   - Epidural per Anesthesia  - Draw CBC, T&S, remaining 1T labs   - Notify Staff  - Recheck in 2 hrs or PRN    2. gHTN:   - PCR, CBC, CMP collected on admit   - BP: (109-144)/(67-86) 117/67  - Asymptomatic for symptoms of pre-eclampsia   - Will continue to closely monitor     3. A1GDM:   - vtx, 1473g 54th% at 29w0d (07/01)  - Fasting glucose PPD#1   - 2-Hr GTT at 6 week postpartum visit     4. Subclinical Hyperthyroidism:   - No home meds to continue     5. AMA:   - Genetic screening negative     Post-Partum Hemorrhage risk - low    Celeste Dixon MD/MPH  OB/GYN PGY-2  [facility]

## 2022-08-26 NOTE — ANESTHESIA PROCEDURE NOTES
CSE    Patient location during procedure: OB  Start time: 8/25/2022 11:11 PM  Timeout: 8/25/2022 11:11 PM  End time: 8/25/2022 11:30 PM    Reason for block: labor analgesia requested by patient and obstetrician    Staffing  Authorizing Provider: Júnior Burr MD  Performing Provider: Kiran Garcia MD    Preanesthetic Checklist  Completed: patient identified, IV checked, site marked, risks and benefits discussed, surgical consent, monitors and equipment checked, pre-op evaluation and timeout performed  CSE  Patient position: sitting  Prep: ChloraPrep  Patient monitoring: heart rate, continuous pulse ox and frequent blood pressure checks  Approach: midline  Spinal Needle  Needle type: Quincke   Needle gauge: 25 G  Needle length: 3.5 in  Epidural Needle  Injection technique: JIGNA saline  Needle type: Tuohy   Needle gauge: 18 G  Needle length: 3.5 in  Needle insertion depth: 8 cm  Location: L3-4  Needle localization: anatomical landmarks   Catheter  Catheter type: springwm2fx  Catheter size: 20 G  Catheter at skin depth: 12 cm  Test dose: lidocaine 1.5% with Epi 1-to-200,000  Test dose: 3 mL  Additional Documentation: incremental injection, negative aspiration for CSF, negative aspiration for heme, no paresthesia on injection and negative test dose

## 2022-08-26 NOTE — L&D DELIVERY NOTE
[facility] - Labor & Delivery  Vaginal Delivery   Operative Note    SUMMARY     Patient called out with intense pain and urge to push following CSE placement. Patient was found to be c/c/+1 and was subsequently set up to push. The bladder was drained with sterile in and out technique.    Normal spontaneous vaginal delivery of live infant after approximately 10 minutes of excellent maternal pushing effort. Infant delivered in direct OA presentation. No nuchal cord was noted with delivery. Infant was placed on mother's abdomen for skin to skin and bulb suctioning was performed. Delayed cord clamping was performed. Cord was cut and clamped and cord blood was collected. Apgars 9/9.    Spontaneous delivery of placenta with gentle traction applied. IV pitocin was given noting good uterine tone with minimal bleeding. No trailing membranes were noted on bimanual examination. Placenta was inspected and noted to be intact.    A bleeding periurethral laceration was noted in the midline. A sterile red rubber catheter was placed to identify the urethra and the laceration did not involve the urethral tissue. The laceration was repaired and made hemostatic with interrupted sutures of 3-0 vicryl. A small right labial laceration was reapproximated with interrupted sutures of 3-0 vicryl. A small 2nd degree laceration was repaired in the usual fashion with 2-0 vicryl.    Patient tolerated delivery well. Sponge, needle, and lap counted correctly x 2.  cc.      Indications:  (spontaneous vaginal delivery)     Pregnancy complicated by:   Patient Active Problem List   Diagnosis    Calculus of kidney    Goiter    Gestational hypertension, third trimester    Multigravida of advanced maternal age in third trimester    Subclinical hyperthyroidism    Gestational diabetes mellitus (GDM) affecting second pregnancy     (spontaneous vaginal delivery)     Admitting GA: 37w0d    Delivery Information for Justino Krishnan    Birth  "information:  YOB: 2020   Time of birth: 12:03 AM   Sex: male   Head Delivery Date/Time: 2022 12:03 AM   Delivery type: Vaginal, Spontaneous   Gestational Age: 37w0d    Delivery Providers    Delivering clinician: Julie R. Jeansonne, MD   Provider Role    JANINE Agrawal MD Teiraney A Turner, RN Kacie Orso, RN             Measurements    Weight: 3030 g  Weight (lbs): 6 lb 10.9 oz  Length: 48.3 cm  Length (in): 19"  Head circumference: 34.3 cm  Chest circumference: 31.8 cm         Apgars    Living status: Living  Apgars:  1 min.:  5 min.:  10 min.:  15 min.:  20 min.:    Skin color:  1  1       Heart rate:  2  2       Reflex irritability:  2  2       Muscle tone:  2  2       Respiratory effort:  2  2       Total:  9  9       Apgars assigned by: YVONNE EDOUARD RN         Operative Delivery    Forceps attempted?: No  Vacuum extractor attempted?: No         Shoulder Dystocia    Shoulder dystocia present?: No           Presentation    Presentation: Transverse  Position: Middle           Interventions/Resuscitation    Method: Bulb Suctioning, Tactile Stimulation       Cord    Vessels: 3 vessels  Complications: None  Delayed Cord Clamping?: No  Cord Clamped Date/Time: 2022 12:05 AM  Cord Blood Disposition: Sent with Baby  Gases Sent?: No  Stem Cell Collection (by MD): No       Placenta    Placenta delivery date/time: 2022 0008  Placenta removal: Spontaneous  Placenta appearance: Intact  Placenta disposition: discarded           Labor Events:       labor: No     Labor Onset Date/Time:         Dilation Complete Date/Time: 2022 23:43     Start Pushing Date/Time: 2022 23:45       Start Pushing Date/Time: 2022 23:45     Rupture Date/Time: 22         Rupture type:          Fluid Amount:       Fluid Color: Clear      Fluid Odor:       Membrane Status: SRM (Spontaneous Rupture)               steroids: None     Antibiotics given " for GBS: No     Induction: none     Indications for induction:        Augmentation:       Indications for augmentation:       Labor complications: None     Additional complications:          Cervical ripening:                     Delivery:      Episiotomy: None     Indication for Episiotomy:       Perineal Lacerations: 2nd Repaired:      Periurethral Laceration: bilateral Repaired: Yes   Labial Laceration: right Repaired: Yes   Sulcus Laceration:   Repaired:     Vaginal Laceration:   Repaired:     Cervical Laceration:   Repaired:     Repair suture:       Repair # of packets: 4     Last Value - EBL - Nursing (mL):       Sum - EBL - Nursing (mL): 0     Last Value - EBL - Anesthesia (mL):      Calculated QBL (mL): 256      Vaginal Sweep Performed: Yes     Surgicount Correct: Yes       Other providers:       Anesthesia    Method: Epidural          Details (if applicable):  Trial of Labor      Categorization:      Priority:     Indications for :     Incision Type:       Additional  information:  Forceps:    Vacuum:    Breech:    Observed anomalies    Other (Comments):         Lupe Wong M.D.  OB/GYN PGY-4

## 2022-08-27 VITALS
OXYGEN SATURATION: 99 % | RESPIRATION RATE: 16 BRPM | SYSTOLIC BLOOD PRESSURE: 121 MMHG | DIASTOLIC BLOOD PRESSURE: 75 MMHG | TEMPERATURE: 98 F | HEART RATE: 70 BPM

## 2022-08-27 LAB — POCT GLUCOSE: 86 MG/DL (ref 70–110)

## 2022-08-27 PROCEDURE — 99024 PR POST-OP FOLLOW-UP VISIT: ICD-10-PCS | Mod: ,,, | Performed by: OBSTETRICS & GYNECOLOGY

## 2022-08-27 PROCEDURE — 25000003 PHARM REV CODE 250

## 2022-08-27 PROCEDURE — 99024 POSTOP FOLLOW-UP VISIT: CPT | Mod: ,,, | Performed by: OBSTETRICS & GYNECOLOGY

## 2022-08-27 RX ORDER — IRON POLYSACCHARIDE COMPLEX 150 MG
150 CAPSULE ORAL DAILY
Status: DISCONTINUED | OUTPATIENT
Start: 2022-08-27 | End: 2022-08-27 | Stop reason: HOSPADM

## 2022-08-27 RX ORDER — IRON POLYSACCHARIDE COMPLEX 150 MG
150 CAPSULE ORAL DAILY
Qty: 30 CAPSULE | Refills: 1 | Status: SHIPPED | OUTPATIENT
Start: 2022-08-27 | End: 2023-08-25

## 2022-08-27 RX ADMIN — Medication 150 MG: at 09:08

## 2022-08-27 RX ADMIN — PRENATAL VIT W/ FE FUMARATE-FA TAB 27-0.8 MG 1 TABLET: 27-0.8 TAB at 09:08

## 2022-08-27 RX ADMIN — IBUPROFEN 600 MG: 600 TABLET ORAL at 05:08

## 2022-08-27 RX ADMIN — IBUPROFEN 600 MG: 600 TABLET ORAL at 12:08

## 2022-08-27 NOTE — PROGRESS NOTES
Pt states she did not have GDM and refused PP Gluclose tolerance 2 hr test in 6 weeks. Aso, Pt will send BP through messenger to OB.

## 2022-08-27 NOTE — PROGRESS NOTES
POSTPARTUM PROGRESS NOTE    Subjective:     PPD/POD#: 2   Procedure:    EGA: 37w0d   N/V: No   F/C: No   Abd Pain: Mild, well-controlled with oral pain medication   Lochia: Mild   Voiding: Yes   Ambulating: Yes   Bowel fnc: Yes   Breastfeeding: Yes   Contraception: Per primary OB   Circumcision: Verbally consented, needs form.     Objective:      Temp:  [97.7 °F (36.5 °C)-98.5 °F (36.9 °C)] 98 °F (36.7 °C)  Pulse:  [61-77] 61  Resp:  [16-18] 16  SpO2:  [94 %-100 %] 100 %  BP: (102-126)/(66-80) 123/66    Lung: Normal respiratory effort   Abdomen: Soft, appropriately tender   Uterus: Firm, no fundal tenderness   Incision: N/A   : Deferred   Extremities: Bilateral trace edema     Lab Review    Recent Labs   Lab 22  1203 22  2350   * 132*   K 4.1 3.9    102   CO2 21* 18*   BUN 6 8   CREATININE 0.8 0.7   GLU 80 108   PROT 6.2 6.4   BILITOT 0.4 0.3   ALKPHOS 156* 159*   ALT 12 11   AST 18 19       Recent Labs   Lab 22  1203 22  2342 22  1653   WBC 7.34 8.65 9.79   HGB 11.0* 11.7* 9.7*   HCT 32.1* 33.4* 27.9*   MCV 92 90 92    265 198     I/O  No intake or output data in the 24 hours ending 22 0754       Assessment and Plan:   Postpartum care:  - Patient doing well.  - Continue routine management and advances.    gHTN  -Temp:  [97.7 °F (36.5 °C)-98.5 °F (36.9 °C)] 98 °F (36.7 °C)  Pulse:  [61-77] 61  Resp:  [16-18] 16  SpO2:  [94 %-100 %] 100 %  BP: (102-126)/(66-80) 123/66  -asymptomatic  -PC 0.16, Cr 0.8, AST/ALT 18/12, Plt 220  -does not meet criteria for oral blood pressure medication    gDMA1  -fasting glucose 86  -treatment not indicated     Le'Alejandra Francisco, MD PGY-1  Obstetrics and Gynecology      Shaquille Bedolla MD

## 2022-08-27 NOTE — DISCHARGE SUMMARY
Delivery Discharge Summary  Obstetrics      Primary OB Clinician: Evelyn Rubin MD      Admission date: 2022  Discharge date: 2022    Disposition: To home, self care    Discharge Diagnosis List:      Patient Active Problem List   Diagnosis    Calculus of kidney    Goiter    Gestational hypertension, third trimester    Multigravida of advanced maternal age in third trimester    Subclinical hyperthyroidism    Gestational diabetes mellitus (GDM) affecting second pregnancy     (spontaneous vaginal delivery)       Procedure:     Hospital Course:  Maggie Krishnan is a 42 y.o. now , PPD #2 who was admitted on 2022 at 36w6d for labor. Patient was subsequently admitted to labor and delivery unit with signed consents. This IUP is complicated by gHTN, A1GDM, subclinical hyperthyroidism, and AMA    Labor course was uncomplicated and resulted in  without complications. Please see delivery note for further details.     Her postpartum course was uncomplicated.     On discharge day, patient's pain is controlled with oral pain medications. Pt is tolerating ambulation without SOB or CP, and regular diet without N/V. Reports lochia is mild. Denies any HA, vision changes, F/C, LE swelling. Denies any breast pain/soreness.    Pt in stable condition and ready for discharge. She has been instructed to start and/or continue medications and follow up with her obstetrics provider as listed below.    Pertinent studies:  CBC  Recent Labs   Lab 22  1203 22  2342 22  1653   WBC 7.34 8.65 9.79   HGB 11.0* 11.7* 9.7*   HCT 32.1* 33.4* 27.9*   MCV 92 90 92    265 198          Immunization History   Administered Date(s) Administered    COVID-19 MRNA, LN-S PF (MODERNA HALF 0.25 ML DOSE) 2022    COVID-19 Vaccine 2021, 2021, 2021    Influenza - Quadrivalent - MDCK - PF 10/09/2017, 2018, 2020    Influenza - Quadrivalent - PF *Preferred* (6 months and  older) 09/17/2019    Tdap 09/26/2017, 06/29/2022        Delivery:    Episiotomy: None   Lacerations: 2nd   Repair suture:     Repair # of packets: 4   Blood loss (ml):       Birth information:  YOB: 2020   Time of birth: 12:03 AM   Sex: male   Delivery type: Vaginal, Spontaneous   Gestational Age: 37w0d    Delivery Clinician:      Other providers:       Additional  information:  Forceps:    Vacuum:    Breech:    Observed anomalies      Living?:           APGARS  One minute Five minutes Ten minutes   Skin color:         Heart rate:         Grimace:         Muscle tone:         Breathing:         Totals: 9  9        Placenta: Delivered:       appearance      Patient Instructions:   Current Discharge Medication List        START taking these medications    Details   docusate sodium (COLACE) 100 MG capsule Take 2 capsules (200 mg total) by mouth 2 (two) times daily.  Qty: 60 capsule, Refills: 0      ibuprofen (ADVIL,MOTRIN) 600 MG tablet Take 1 tablet (600 mg total) by mouth every 6 (six) hours as needed for Pain.  Qty: 30 tablet, Refills: 1      iron polysaccharides (NIFEREX) 150 mg iron Cap Take 1 capsule (150 mg total) by mouth once daily.  Qty: 30 capsule, Refills: 1           CONTINUE these medications which have NOT CHANGED    Details   loratadine (CLARITIN ORAL) Take by mouth as needed.      prenatal vit,yareli 74-iron-folic (PRENATAL LOW IRON) 27 mg iron- 1 mg Tab Take by mouth.           STOP taking these medications       aspirin 81 MG Chew Comments:   Reason for Stopping:         blood sugar diagnostic Strp Comments:   Reason for Stopping:         blood-glucose meter kit Comments:   Reason for Stopping:         famotidine (PEPCID) 10 MG tablet Comments:   Reason for Stopping:         lancets Misc Comments:   Reason for Stopping:         sars-cov-2, covid-19, (MODERNA COVID-19) 50 mcg/0.25 ml injection (BOOSTER) Comments:   Reason for Stopping:         TRUE METRIX GLUCOSE METER Misc Comments:    Reason for Stopping:         TRUEPLUS LANCETS 30 gauge Misc Comments:   Reason for Stopping:               Discharge Procedure Orders   Diet Adult Regular     Ice to affected area     Other restrictions (specify):   Order Comments: Do not drive while on narcotics. Do not drive until you feel comfortable placing your foot on the break without pain/discomfort.     Pelvic Rest   Order Comments: Do not put anything in your vagina until evaluated by your doctor at your next visit. This includes douching, tampons, sex.     Notify your health care provider if you experience any of the following:  temperature >100.4     Notify your health care provider if you experience any of the following:  persistent nausea and vomiting or diarrhea     Notify your health care provider if you experience any of the following:  severe uncontrolled pain     Notify your health care provider if you experience any of the following:  redness, tenderness, or signs of infection (pain, swelling, redness, odor or green/yellow discharge around incision site)     Notify your health care provider if you experience any of the following:  difficulty breathing or increased cough     Notify your health care provider if you experience any of the following:  severe persistent headache     Notify your health care provider if you experience any of the following:  worsening rash     Notify your health care provider if you experience any of the following:  persistent dizziness, light-headedness, or visual disturbances     Notify your health care provider if you experience any of the following:  increased confusion or weakness     Activity as tolerated        Follow-up Information       Evelyn Rubin MD. Schedule an appointment as soon as possible for a visit in 1 week(s).    Specialty: Obstetrics and Gynecology  Why: BP check  Contact information:  [address] [id number] [phone]                           Maggie Acosta  MD  PGY-2 OB/GYN    Shaquille Bedolla MD

## 2022-08-27 NOTE — NURSING
Mother-baby care guide/discharge teaching reviewed with patient. Pt. received physical copy of the mother-baby care guide and verbalizes understanding.

## 2022-08-29 LAB
RUBV IGG SER-ACNC: 27.9 IU/ML
RUBV IGG SER-IMP: REACTIVE

## 2022-08-29 NOTE — ANESTHESIA POSTPROCEDURE EVALUATION
Anesthesia Post Evaluation    Patient: Maggie Krishnan    Procedure(s) Performed: * No procedures listed *    Final Anesthesia Type: CSE      Patient location during evaluation: labor & delivery  Patient participation: Yes- Able to Participate  Level of consciousness: awake and alert  Post-procedure vital signs: reviewed and stable  Pain management: adequate  Airway patency: patent  CARLITO mitigation strategies: Multimodal analgesia  PONV status at discharge: No PONV  Anesthetic complications: no      Cardiovascular status: stable and blood pressure returned to baseline  Respiratory status: unassisted, spontaneous ventilation and room air  Hydration status: euvolemic  Follow-up not needed.          Vitals Value Taken Time   /75 08/27/22 0811   Temp 36.6 °C (97.9 °F) 08/27/22 0811   Pulse 70 08/27/22 0811   Resp 16 08/27/22 0811   SpO2 99 % 08/27/22 0811         No case tracking events are documented in the log.      Pain/Lauren Score: No data recorded

## 2022-09-02 ENCOUNTER — POSTPARTUM VISIT (OUTPATIENT)
Dept: OBSTETRICS AND GYNECOLOGY | Facility: CLINIC | Age: 42
End: 2022-09-02
Payer: COMMERCIAL

## 2022-09-02 VITALS
DIASTOLIC BLOOD PRESSURE: 86 MMHG | HEIGHT: 67 IN | BODY MASS INDEX: 23.43 KG/M2 | SYSTOLIC BLOOD PRESSURE: 134 MMHG | WEIGHT: 149.25 LBS

## 2022-09-02 PROCEDURE — 1111F PR DISCHARGE MEDS RECONCILED W/ CURRENT OUTPATIENT MED LIST: ICD-10-PCS | Mod: CPTII,S$GLB,, | Performed by: OBSTETRICS & GYNECOLOGY

## 2022-09-02 PROCEDURE — 0502F SUBSEQUENT PRENATAL CARE: CPT | Mod: S$GLB,,, | Performed by: OBSTETRICS & GYNECOLOGY

## 2022-09-02 PROCEDURE — 0502F PR SUBSEQUENT PRENATAL CARE: ICD-10-PCS | Mod: S$GLB,,, | Performed by: OBSTETRICS & GYNECOLOGY

## 2022-09-02 PROCEDURE — 99999 PR PBB SHADOW E&M-EST. PATIENT-LVL III: ICD-10-PCS | Mod: PBBFAC,,, | Performed by: OBSTETRICS & GYNECOLOGY

## 2022-09-02 PROCEDURE — 1111F DSCHRG MED/CURRENT MED MERGE: CPT | Mod: CPTII,S$GLB,, | Performed by: OBSTETRICS & GYNECOLOGY

## 2022-09-02 PROCEDURE — 99999 PR PBB SHADOW E&M-EST. PATIENT-LVL III: CPT | Mod: PBBFAC,,, | Performed by: OBSTETRICS & GYNECOLOGY

## 2022-09-02 NOTE — PROGRESS NOTES
"BP check:    Vitals:    09/02/22 1152   BP: (!) 142/86   Weight: 67.7 kg (149 lb 4 oz)   Height: 5' 7" (1.702 m)       Repeat: 134/86    Patient asymptomatic.      RTC 6 week PP exam.         Evelyn Rubin MD  [facility]  09/02/2022    "

## 2022-09-11 ENCOUNTER — PATIENT MESSAGE (OUTPATIENT)
Dept: OBSTETRICS AND GYNECOLOGY | Facility: CLINIC | Age: 42
End: 2022-09-11
Payer: COMMERCIAL

## 2022-09-13 ENCOUNTER — OFFICE VISIT (OUTPATIENT)
Dept: OBSTETRICS AND GYNECOLOGY | Facility: CLINIC | Age: 42
End: 2022-09-13
Payer: COMMERCIAL

## 2022-09-13 VITALS
WEIGHT: 148.81 LBS | BODY MASS INDEX: 23.36 KG/M2 | DIASTOLIC BLOOD PRESSURE: 82 MMHG | HEIGHT: 67 IN | SYSTOLIC BLOOD PRESSURE: 138 MMHG

## 2022-09-13 DIAGNOSIS — E05.90 SUBCLINICAL HYPERTHYROIDISM: ICD-10-CM

## 2022-09-13 DIAGNOSIS — E04.9 GOITER: ICD-10-CM

## 2022-09-13 DIAGNOSIS — O24.419 GESTATIONAL DIABETES MELLITUS (GDM) AFFECTING SECOND PREGNANCY: ICD-10-CM

## 2022-09-13 DIAGNOSIS — O13.3 GESTATIONAL HYPERTENSION, THIRD TRIMESTER: ICD-10-CM

## 2022-09-13 DIAGNOSIS — N81.9 FEMALE GENITAL PROLAPSE, UNSPECIFIED TYPE: ICD-10-CM

## 2022-09-13 PROCEDURE — 3075F PR MOST RECENT SYSTOLIC BLOOD PRESS GE 130-139MM HG: ICD-10-PCS | Mod: CPTII,S$GLB,, | Performed by: OBSTETRICS & GYNECOLOGY

## 2022-09-13 PROCEDURE — 1111F PR DISCHARGE MEDS RECONCILED W/ CURRENT OUTPATIENT MED LIST: ICD-10-PCS | Mod: CPTII,S$GLB,, | Performed by: OBSTETRICS & GYNECOLOGY

## 2022-09-13 PROCEDURE — 3008F BODY MASS INDEX DOCD: CPT | Mod: CPTII,S$GLB,, | Performed by: OBSTETRICS & GYNECOLOGY

## 2022-09-13 PROCEDURE — 1111F DSCHRG MED/CURRENT MED MERGE: CPT | Mod: CPTII,S$GLB,, | Performed by: OBSTETRICS & GYNECOLOGY

## 2022-09-13 PROCEDURE — 3008F PR BODY MASS INDEX (BMI) DOCUMENTED: ICD-10-PCS | Mod: CPTII,S$GLB,, | Performed by: OBSTETRICS & GYNECOLOGY

## 2022-09-13 PROCEDURE — 3079F PR MOST RECENT DIASTOLIC BLOOD PRESSURE 80-89 MM HG: ICD-10-PCS | Mod: CPTII,S$GLB,, | Performed by: OBSTETRICS & GYNECOLOGY

## 2022-09-13 PROCEDURE — 3079F DIAST BP 80-89 MM HG: CPT | Mod: CPTII,S$GLB,, | Performed by: OBSTETRICS & GYNECOLOGY

## 2022-09-13 PROCEDURE — 99213 OFFICE O/P EST LOW 20 MIN: CPT | Mod: S$GLB,,, | Performed by: OBSTETRICS & GYNECOLOGY

## 2022-09-13 PROCEDURE — 1159F MED LIST DOCD IN RCRD: CPT | Mod: CPTII,S$GLB,, | Performed by: OBSTETRICS & GYNECOLOGY

## 2022-09-13 PROCEDURE — 99213 PR OFFICE/OUTPT VISIT, EST, LEVL III, 20-29 MIN: ICD-10-PCS | Mod: S$GLB,,, | Performed by: OBSTETRICS & GYNECOLOGY

## 2022-09-13 PROCEDURE — 3075F SYST BP GE 130 - 139MM HG: CPT | Mod: CPTII,S$GLB,, | Performed by: OBSTETRICS & GYNECOLOGY

## 2022-09-13 PROCEDURE — 1159F PR MEDICATION LIST DOCUMENTED IN MEDICAL RECORD: ICD-10-PCS | Mod: CPTII,S$GLB,, | Performed by: OBSTETRICS & GYNECOLOGY

## 2022-09-13 NOTE — PROGRESS NOTES
Subjective:      Maggie Krishnan is a 42 y.o.  who presents for complaints of pelvic organ prolapse.  She is status post  uncomplicated vaginal delivery 2 weeks ago.  Her hospitalization was complicated by GHTN not requiring antihypertensives.  She is not breastfeeding.  She reports mood is good.      Today, she presents with complaints of vaginal pressure and bulge that is worse after prolonged standing.  Denies urinary incontinence or fecal incontinence.       Past Medical History:   Diagnosis Date    History of gestational hypertension     History of IUGR (intrauterine growth retardation) and stillbirth, currently pregnant 3/11/2022       Current Outpatient Medications:     loratadine (CLARITIN ORAL), Take by mouth as needed., Disp: , Rfl:     docusate sodium (COLACE) 100 MG capsule, Take 2 capsules (200 mg total) by mouth 2 (two) times daily. (Patient not taking: Reported on 2022), Disp: 60 capsule, Rfl: 0    ibuprofen (ADVIL,MOTRIN) 600 MG tablet, Take 1 tablet (600 mg total) by mouth every 6 (six) hours as needed for Pain. (Patient not taking: Reported on 2022), Disp: 30 tablet, Rfl: 1    iron polysaccharides (NIFEREX) 150 mg iron Cap, Take 1 capsule (150 mg total) by mouth once daily. (Patient not taking: Reported on 2022), Disp: 30 capsule, Rfl: 1    prenatal vit,yareli 74-iron-folic (PRENATAL LOW IRON) 27 mg iron- 1 mg Tab, Take by mouth., Disp: , Rfl:       Objective:     Vitals:    22 0941   BP: 138/82       APPEARANCE: Well nourished, well developed, in no acute distress.  PSYCH: Appropriate mood and affect.  NECK:  Supple, no thyromegaly.  BREASTS:  No masses, no nipple discharge.  CARDIOVASCULAR:  Regular rate and rhythm.  LUNGS:  Clear to auscultation bilaterally.  ABDOMEN:  Soft, nontender.  GENITOURINARY:  External genitalia normal in appearance, normal perineal body, normal urethral meatus.  Grade 1 cystocele with valsalva.   EXTREMITIES: No edema.      Assessment:       (spontaneous vaginal delivery)    Gestational hypertension, third trimester    Subclinical hyperthyroidism    Gestational diabetes mellitus (GDM) affecting second pregnancy    Goiter    Female genital prolapse, unspecified type      Plan:     1. Postpartum course reviewed and discussed with patient.  All questions answered.    2. POP: discussed expected natural course and expected improvement throughout the postpartum period.  Encouraged pelvic rest and frequent sitting.  If continued at 6 week PP check, will refer to pelvic floor PT.   3. GHTN: BP well controlled.  4. GDMA1: will need 2 hr GTT at 6 week PP visit.  5. Subclinical hyperthyroidism and thyroid goiter: recommend follow up with endocrine.  6. Return to clinic in 4 weeks.    I spent a total of 22 minutes on the day of the visit.This includes face to face time and non-face to face time preparing to see the patient (eg, review of tests), obtaining and/or reviewing separately obtained history, documenting clinical information in the electronic or other health record, independently interpreting results and communicating results to the patient/family/caregiver, or care coordinator.        Evelyn Rubin MD  [facility]  2022

## 2022-09-16 ENCOUNTER — PATIENT MESSAGE (OUTPATIENT)
Dept: OBSTETRICS AND GYNECOLOGY | Facility: CLINIC | Age: 42
End: 2022-09-16
Payer: COMMERCIAL

## 2022-11-11 ENCOUNTER — POSTPARTUM VISIT (OUTPATIENT)
Dept: OBSTETRICS AND GYNECOLOGY | Facility: CLINIC | Age: 42
End: 2022-11-11
Payer: COMMERCIAL

## 2022-11-11 VITALS — WEIGHT: 149.5 LBS | DIASTOLIC BLOOD PRESSURE: 88 MMHG | SYSTOLIC BLOOD PRESSURE: 144 MMHG | BODY MASS INDEX: 23.41 KG/M2

## 2022-11-11 DIAGNOSIS — O24.410 DIET CONTROLLED GESTATIONAL DIABETES MELLITUS (GDM), ANTEPARTUM: ICD-10-CM

## 2022-11-11 DIAGNOSIS — Z12.31 BREAST CANCER SCREENING BY MAMMOGRAM: ICD-10-CM

## 2022-11-11 DIAGNOSIS — Z12.4 CERVICAL CANCER SCREENING: ICD-10-CM

## 2022-11-11 PROCEDURE — 87624 HPV HI-RISK TYP POOLED RSLT: CPT | Performed by: OBSTETRICS & GYNECOLOGY

## 2022-11-11 PROCEDURE — 99999 PR PBB SHADOW E&M-EST. PATIENT-LVL III: ICD-10-PCS | Mod: PBBFAC,,, | Performed by: OBSTETRICS & GYNECOLOGY

## 2022-11-11 PROCEDURE — 0503F PR POSTPARTUM CARE VISIT: ICD-10-PCS | Mod: CPTII,S$GLB,, | Performed by: OBSTETRICS & GYNECOLOGY

## 2022-11-11 PROCEDURE — 99999 PR PBB SHADOW E&M-EST. PATIENT-LVL III: CPT | Mod: PBBFAC,,, | Performed by: OBSTETRICS & GYNECOLOGY

## 2022-11-11 PROCEDURE — 88175 CYTOPATH C/V AUTO FLUID REDO: CPT | Performed by: OBSTETRICS & GYNECOLOGY

## 2022-11-11 PROCEDURE — 0503F POSTPARTUM CARE VISIT: CPT | Mod: CPTII,S$GLB,, | Performed by: OBSTETRICS & GYNECOLOGY

## 2022-11-11 NOTE — PROGRESS NOTES
Subjective:      Maggie Krishnan is a 42 y.o.  who is status post  uncomplicated vaginal delivery  11 weeks ago.  Her hospitalization was complicated by GHTN not requiring antihypertensives.  She is not breastfeeding.  She reports mood is good.      Today, vaginal bulge has been resolved for approximately 2 weeks.  Denies urinary incontinence or fecal incontinence.       Past Medical History:   Diagnosis Date    History of gestational hypertension     History of IUGR (intrauterine growth retardation) and stillbirth, currently pregnant 3/11/2022       Current Outpatient Medications:     loratadine (CLARITIN ORAL), Take by mouth as needed., Disp: , Rfl:     prenatal vit,yareli 74-iron-folic (PRENATAL LOW IRON) 27 mg iron- 1 mg Tab, Take by mouth., Disp: , Rfl:     docusate sodium (COLACE) 100 MG capsule, Take 2 capsules (200 mg total) by mouth 2 (two) times daily., Disp: 60 capsule, Rfl: 0    ibuprofen (ADVIL,MOTRIN) 600 MG tablet, Take 1 tablet (600 mg total) by mouth every 6 (six) hours as needed for Pain., Disp: 30 tablet, Rfl: 1    iron polysaccharides (NIFEREX) 150 mg iron Cap, Take 1 capsule (150 mg total) by mouth once daily., Disp: 30 capsule, Rfl: 1      Objective:     Vitals:    22 1206   BP: (!) 144/88         APPEARANCE: Well nourished, well developed, in no acute distress.  PSYCH: Appropriate mood and affect.  NECK:  Supple, no thyromegaly.  BREASTS:  No masses, no nipple discharge.  CARDIOVASCULAR:  Regular rate and rhythm.  LUNGS:  Clear to auscultation bilaterally.  ABDOMEN:  Soft, nontender.  GENITOURINARY:  External genitalia normal in appearance, normal perineal body, normal urethral meatus.  No significant prolapse.  Uterus small, nontender. No adnexal masses.  EXTREMITIES: No edema.      Assessment:     Postpartum examination following vaginal delivery    Cervical cancer screening  -     Liquid-Based Pap Smear, Screening  -     HPV High Risk Genotypes, PCR    Diet controlled  gestational diabetes mellitus (GDM), antepartum  -     Hemoglobin A1C; Future; Expected date: 11/11/2022    Breast cancer screening by mammogram  -     Mammo Digital Screening Bilat w/ Alexx; Future; Expected date: 11/11/2022      Plan:     1. Postpartum course reviewed and discussed with patient.  All questions answered.    2. POP: resolved, consider pelvic floor PT for recurrent symptoms.   3. GHTN: BP well controlled at home, mild range here.  Recommend follow up with PCP.   4. GDMA1: declines 2 hr GTT, A1C ordered.  5. Subclinical hyperthyroidism and thyroid goiter: recommend follow up with endocrine.  6. Contraception: condoms.  7. Return to clinic in 1 year for annual or as needed.            Evelyn Rubin MD  [facility]  11/11/2022

## 2022-11-28 PROBLEM — O13.3 GESTATIONAL HYPERTENSION, THIRD TRIMESTER: Status: RESOLVED | Noted: 2022-08-25 | Resolved: 2022-11-28

## 2023-07-31 ENCOUNTER — HOSPITAL ENCOUNTER (OUTPATIENT)
Dept: RADIOLOGY | Facility: HOSPITAL | Age: 43
Discharge: HOME | End: 2023-07-31
Attending: OBSTETRICS & GYNECOLOGY
Payer: COMMERCIAL

## 2023-07-31 DIAGNOSIS — Z12.31 BREAST CANCER SCREENING BY MAMMOGRAM: ICD-10-CM

## 2023-07-31 PROCEDURE — 77063 BREAST TOMOSYNTHESIS BI: CPT | Mod: 26,,, | Performed by: RADIOLOGY

## 2023-07-31 PROCEDURE — 77067 SCR MAMMO BI INCL CAD: CPT | Mod: TC,PO

## 2023-07-31 PROCEDURE — 77067 MAMMO DIGITAL SCREENING BILAT WITH TOMO: ICD-10-PCS | Mod: 26,,, | Performed by: RADIOLOGY

## 2023-07-31 PROCEDURE — 77067 SCR MAMMO BI INCL CAD: CPT | Mod: 26,,, | Performed by: RADIOLOGY

## 2023-07-31 PROCEDURE — 77063 MAMMO DIGITAL SCREENING BILAT WITH TOMO: ICD-10-PCS | Mod: 26,,, | Performed by: RADIOLOGY

## 2023-08-25 ENCOUNTER — ON-DEMAND VIRTUAL (OUTPATIENT)
Dept: URGENT CARE | Facility: CLINIC | Age: 43
End: 2023-08-25
Payer: COMMERCIAL

## 2023-08-25 DIAGNOSIS — S91.312A LACERATION OF LEFT HEEL, INITIAL ENCOUNTER: Primary | ICD-10-CM

## 2023-08-25 PROCEDURE — 99202 PR OFFICE/OUTPT VISIT, NEW, LEVL II, 15-29 MIN: ICD-10-PCS | Mod: 95,,, | Performed by: NURSE PRACTITIONER

## 2023-08-25 PROCEDURE — 99202 OFFICE O/P NEW SF 15 MIN: CPT | Mod: 95,,, | Performed by: NURSE PRACTITIONER

## 2023-08-25 NOTE — PATIENT INSTRUCTIONS
Recommendations:   .Keep area clean and dry. Ok to lightly bandage.     . Wash with mild soap and water. Avoid use of peroxide or alcohol on wounds as this could alter the healing process.      . Topical ointments and/or antibiotics as directed.     .Monitor for worsening signs of infection: fever, increased redness, warmth, swelling, pain, or purulent drainage.

## 2023-08-25 NOTE — PROGRESS NOTES
Subjective:      Patient ID: Maggie Krishnan is a 43 y.o. female.    Vitals:  vitals were not taken for this visit.     Chief Complaint: Laceration      Visit Type: TELE AUDIOVISUAL    Present with the patient at the time of consultation: TELEMED PRESENT WITH PATIENT: None    Past Medical History:   Diagnosis Date    History of gestational hypertension     History of IUGR (intrauterine growth retardation) and stillbirth, currently pregnant 3/11/2022     Past Surgical History:   Procedure Laterality Date    FOOT SURGERY Left 2000    WISDOM TOOTH EXTRACTION       Review of patient's allergies indicates:   Allergen Reactions    Alcohol     Sulfa (sulfonamide antibiotics) Nausea And Vomiting     Current Outpatient Medications on File Prior to Visit   Medication Sig Dispense Refill    loratadine (CLARITIN ORAL) Take by mouth as needed.      [DISCONTINUED] aspirin 81 MG Chew Take 1 tablet (81 mg total) by mouth once daily. 90 tablet 3    [DISCONTINUED] blood-glucose meter kit Use as instructed 1 each 0    [DISCONTINUED] docusate sodium (COLACE) 100 MG capsule Take 2 capsules (200 mg total) by mouth 2 (two) times daily. 60 capsule 0    [DISCONTINUED] famotidine (PEPCID) 10 MG tablet Take 10 mg by mouth 2 (two) times daily as needed for Heartburn.      [DISCONTINUED] ibuprofen (ADVIL,MOTRIN) 600 MG tablet Take 1 tablet (600 mg total) by mouth every 6 (six) hours as needed for Pain. 30 tablet 1    [DISCONTINUED] iron polysaccharides (NIFEREX) 150 mg iron Cap Take 1 capsule (150 mg total) by mouth once daily. 30 capsule 1    [DISCONTINUED] prenatal vit,yareli 74-iron-folic (PRENATAL LOW IRON) 27 mg iron- 1 mg Tab Take by mouth.       No current facility-administered medications on file prior to visit.     Family History   Problem Relation Age of Onset    Breast cancer Neg Hx     Colon cancer Neg Hx     Ovarian cancer Neg Hx            Ohs Peq Odvv Intake    8/25/2023  7:28 AM CDT - Filed by Patient   Describe your reason  "for todays visit wound on heel, went to urgent care last night, NP used glue but one spot was not adequately glued and bleeding has started again. large cut, avoided achilles but NP believed too close to achilles to stitch   What is your current physical address in the event of a medical emergency? [address], [place] [address]   Are you able to take your vital signs? Yes   Systolic Blood Pressure: 107   Diastolic Blood Pressure: 78   Weight: 155   Height: 67   Pulse: 70   Temperature:    Respiration rate:    Pulse Oxygen:    Please attach any relevant images or files          Opened a metal door, swung back and "sliced" her heel on the left foot. Right below her achilles tendon. Wound was glued. Bleeding continues. Mild pain. Currently on Keflex.    Laceration         Constitution: Negative for chills and fever.   Skin:  Positive for laceration (left heel of foot).        Objective:   The physical exam was conducted virtually.  Physical Exam   Constitutional: She is oriented to person, place, and time. She does not appear ill. No distress.   HENT:   Head: Normocephalic and atraumatic.   Nose: Nose normal.   Eyes: Extraocular movement intact   Pulmonary/Chest: Effort normal.   Abdominal: Normal appearance.   Musculoskeletal: Normal range of motion.         General: Normal range of motion.      Left foot: Plantar foot sensation: normal. Comments: Wound not visualized. Per self exam. [place] shape wound right below the achilles. +bleeding. No ROM of issues.   Neurological: no focal deficit. She is alert and oriented to person, place, and time.   Psychiatric: Her behavior is normal. Mood normal.   Vitals reviewed.      Assessment:     1. Laceration of left heel, initial encounter        Plan:   Patient encouraged to monitor symptoms closely and instructed to follow-up for new or worsening symptoms. Further, in-person, evaluation may be necessary for continued treatment. Please follow up with your " primary care doctor or specialist as needed. Verbally discussed plan. Patient confirms understanding and is in agreement with treatment and plan.     You must understand that you've received a Virtual Care evaluation only and that you may be released before all your medical problems are known or treated. You, the patient, will arrange for follow up care as instructed.      Laceration of left heel, initial encounter

## 2024-07-30 ENCOUNTER — PATIENT MESSAGE (OUTPATIENT)
Dept: OBSTETRICS AND GYNECOLOGY | Facility: CLINIC | Age: 44
End: 2024-07-30
Payer: COMMERCIAL

## 2024-07-30 DIAGNOSIS — Z12.31 ENCOUNTER FOR SCREENING MAMMOGRAM FOR MALIGNANT NEOPLASM OF BREAST: Primary | ICD-10-CM

## 2024-08-05 ENCOUNTER — HOSPITAL ENCOUNTER (OUTPATIENT)
Dept: RADIOLOGY | Facility: HOSPITAL | Age: 44
Discharge: HOME | End: 2024-08-05
Attending: STUDENT IN AN ORGANIZED HEALTH CARE EDUCATION/TRAINING PROGRAM
Payer: COMMERCIAL

## 2024-08-05 DIAGNOSIS — Z12.31 ENCOUNTER FOR SCREENING MAMMOGRAM FOR MALIGNANT NEOPLASM OF BREAST: ICD-10-CM

## 2024-08-05 PROCEDURE — 77067 SCR MAMMO BI INCL CAD: CPT | Mod: 26,,, | Performed by: RADIOLOGY

## 2024-08-05 PROCEDURE — 77063 BREAST TOMOSYNTHESIS BI: CPT | Mod: 26,,, | Performed by: RADIOLOGY

## 2024-08-05 PROCEDURE — 77067 SCR MAMMO BI INCL CAD: CPT | Mod: TC,PO

## 2024-08-08 ENCOUNTER — TELEPHONE (OUTPATIENT)
Dept: RADIOLOGY | Facility: HOSPITAL | Age: 44
End: 2024-08-08
Payer: COMMERCIAL

## 2024-08-08 ENCOUNTER — PATIENT MESSAGE (OUTPATIENT)
Dept: OBSTETRICS AND GYNECOLOGY | Facility: CLINIC | Age: 44
End: 2024-08-08
Payer: COMMERCIAL

## 2024-08-08 ENCOUNTER — TELEPHONE (OUTPATIENT)
Dept: OBSTETRICS AND GYNECOLOGY | Facility: CLINIC | Age: 44
End: 2024-08-08
Payer: COMMERCIAL

## 2024-08-13 ENCOUNTER — HOSPITAL ENCOUNTER (OUTPATIENT)
Dept: RADIOLOGY | Facility: HOSPITAL | Age: 44
Discharge: HOME | End: 2024-08-13
Attending: STUDENT IN AN ORGANIZED HEALTH CARE EDUCATION/TRAINING PROGRAM
Payer: COMMERCIAL

## 2024-08-13 DIAGNOSIS — R92.8 ABNORMAL FINDING ON BREAST IMAGING: ICD-10-CM

## 2024-08-13 PROCEDURE — 77061 BREAST TOMOSYNTHESIS UNI: CPT | Mod: 26,LT,, | Performed by: RADIOLOGY

## 2024-08-13 PROCEDURE — 77065 DX MAMMO INCL CAD UNI: CPT | Mod: TC,LT

## 2024-08-13 PROCEDURE — 76642 ULTRASOUND BREAST LIMITED: CPT | Mod: TC,LT

## 2024-08-13 PROCEDURE — 77065 DX MAMMO INCL CAD UNI: CPT | Mod: 26,LT,, | Performed by: RADIOLOGY

## 2024-08-13 PROCEDURE — 76642 ULTRASOUND BREAST LIMITED: CPT | Mod: 26,LT,, | Performed by: RADIOLOGY

## 2024-11-04 ENCOUNTER — OFFICE VISIT (OUTPATIENT)
Dept: OBSTETRICS AND GYNECOLOGY | Facility: CLINIC | Age: 44
End: 2024-11-04
Payer: COMMERCIAL

## 2024-11-04 ENCOUNTER — PATIENT MESSAGE (OUTPATIENT)
Dept: OBSTETRICS AND GYNECOLOGY | Facility: CLINIC | Age: 44
End: 2024-11-04

## 2024-11-04 VITALS
HEIGHT: 67 IN | SYSTOLIC BLOOD PRESSURE: 96 MMHG | BODY MASS INDEX: 24.92 KG/M2 | DIASTOLIC BLOOD PRESSURE: 72 MMHG | WEIGHT: 158.75 LBS

## 2024-11-04 DIAGNOSIS — N81.9 FEMALE GENITAL PROLAPSE, UNSPECIFIED TYPE: ICD-10-CM

## 2024-11-04 DIAGNOSIS — Z01.419 WELL WOMAN EXAM WITH ROUTINE GYNECOLOGICAL EXAM: Primary | ICD-10-CM

## 2024-11-04 DIAGNOSIS — N95.2 VAGINAL ATROPHY: ICD-10-CM

## 2024-11-04 PROBLEM — O09.523 MULTIGRAVIDA OF ADVANCED MATERNAL AGE IN THIRD TRIMESTER: Status: RESOLVED | Noted: 2022-08-25 | Resolved: 2024-11-04

## 2024-11-04 PROBLEM — O24.419 GESTATIONAL DIABETES MELLITUS (GDM) AFFECTING SECOND PREGNANCY: Status: RESOLVED | Noted: 2022-08-25 | Resolved: 2024-11-04

## 2024-11-04 PROCEDURE — 1160F RVW MEDS BY RX/DR IN RCRD: CPT | Mod: CPTII,S$GLB,, | Performed by: STUDENT IN AN ORGANIZED HEALTH CARE EDUCATION/TRAINING PROGRAM

## 2024-11-04 PROCEDURE — 3074F SYST BP LT 130 MM HG: CPT | Mod: CPTII,S$GLB,, | Performed by: STUDENT IN AN ORGANIZED HEALTH CARE EDUCATION/TRAINING PROGRAM

## 2024-11-04 PROCEDURE — 99396 PREV VISIT EST AGE 40-64: CPT | Mod: S$GLB,,, | Performed by: STUDENT IN AN ORGANIZED HEALTH CARE EDUCATION/TRAINING PROGRAM

## 2024-11-04 PROCEDURE — 3078F DIAST BP <80 MM HG: CPT | Mod: CPTII,S$GLB,, | Performed by: STUDENT IN AN ORGANIZED HEALTH CARE EDUCATION/TRAINING PROGRAM

## 2024-11-04 PROCEDURE — 1159F MED LIST DOCD IN RCRD: CPT | Mod: CPTII,S$GLB,, | Performed by: STUDENT IN AN ORGANIZED HEALTH CARE EDUCATION/TRAINING PROGRAM

## 2024-11-04 PROCEDURE — 99999 PR PBB SHADOW E&M-EST. PATIENT-LVL III: CPT | Mod: PBBFAC,,, | Performed by: STUDENT IN AN ORGANIZED HEALTH CARE EDUCATION/TRAINING PROGRAM

## 2024-11-04 PROCEDURE — 3008F BODY MASS INDEX DOCD: CPT | Mod: CPTII,S$GLB,, | Performed by: STUDENT IN AN ORGANIZED HEALTH CARE EDUCATION/TRAINING PROGRAM

## 2024-11-04 RX ORDER — TRETINOIN 0.25 MG/G
CREAM TOPICAL
COMMUNITY
Start: 2024-08-20

## 2024-11-04 RX ORDER — ESTRADIOL 0.1 MG/G
CREAM VAGINAL
Qty: 42.5 G | Refills: 1 | Status: SHIPPED | OUTPATIENT
Start: 2024-11-04

## 2024-11-04 NOTE — PROGRESS NOTES
[place] - [facility]  Obstetrics & Gynecology      History of Present Illness:   Annual and would like PFPT referral for cystocele  Menstrual History:  reports periods are beginning to get irregular. Has not had menses in three months. Vaginal dryness. POTS. Hot flashes not too often. Mother went through menopause early. Has h/o thyroid issues. Had TSH recently through would and is normal  Obstetric Hx: .   LMP: n/a  STD/STI Hx: Denies any history of STD's  Contraception Hx: planned vasectomy. Condoms for now.   Sexually Active: one male partner  Family history: Denies any personal or family history of GYN cancer.   Social: Wears seatbelts. Exercises. Feels safe at home. No severe depressive episodes recently. No issues with tobacco/vaping, EtOH, illicit drugs.  Last pap: 2022 normal, HPV neg.   Last mammo: 2024  Vitamins: taking PNV      Current Outpatient Medications on File Prior to Visit   Medication Sig    loratadine (CLARITIN ORAL) Take by mouth as needed.    [DISCONTINUED] aspirin 81 MG Chew Take 1 tablet (81 mg total) by mouth once daily.    [DISCONTINUED] blood-glucose meter kit Use as instructed    [DISCONTINUED] famotidine (PEPCID) 10 MG tablet Take 10 mg by mouth 2 (two) times daily as needed for Heartburn.     No current facility-administered medications on file prior to visit.       Review of patient's allergies indicates:   Allergen Reactions    Alcohol     Sulfa (sulfonamide antibiotics) Nausea And Vomiting       Past Medical History:   Diagnosis Date    History of gestational hypertension     History of IUGR (intrauterine growth retardation) and stillbirth, currently pregnant 3/11/2022     OB History    Para Term  AB Living   2 2 2 0 0 2   SAB IAB Ectopic Multiple Live Births   0 0 0 0 1      # Outcome Date GA Lbr Nick/2nd Weight Sex Type Anes PTL Lv   2 Term 22 37w0d / 00:20 3.03 kg (6 lb 10.9 oz) M Vag-Spont EPI N GRICELDA      Name: MICHELLE DELGADILLO       Apgar1: 9  Apgar5: 9   1 Term 11/06/17 37w0d  2.126 kg (4 lb 11 oz) F Vag-Spont        Past Surgical History:   Procedure Laterality Date    FOOT SURGERY Left 2000    WISDOM TOOTH EXTRACTION       Family History    None       Tobacco Use    Smoking status: Never    Smokeless tobacco: Never   Substance and Sexual Activity    Alcohol use: Never    Drug use: Never    Sexual activity: Yes     Partners: Male     Review of Systems   Constitutional:  Negative for activity change, appetite change, fever and unexpected weight change.   Respiratory:  Negative for shortness of breath.    Cardiovascular:  Negative for chest pain.   Gastrointestinal:  Negative for abdominal pain, blood in stool, constipation, diarrhea, nausea and vomiting.   Genitourinary:  Positive for vaginal dryness. Negative for dysmenorrhea, dyspareunia, dysuria, hematuria, menorrhagia, pelvic pain, vaginal bleeding, vaginal discharge, vaginal pain, urinary incontinence, postcoital bleeding and vaginal odor.   Integumentary:  Negative for breast mass.   Breast: Negative for lump and mass    Objective:     Vital Signs (Most Recent):    Vital Signs (24h Range):  [unfilled]        There is no height or weight on file to calculate BMI.  No LMP recorded.    Physical Exam:   Constitutional: She is oriented to person, place, and time. She appears well-developed and well-nourished. No distress.    HENT:   Head: Normocephalic and atraumatic.    Eyes: Conjunctivae and EOM are normal.    Neck: No thyromegaly present.    Cardiovascular:  Normal rate.             Pulmonary/Chest: Effort normal. No respiratory distress. Right breast exhibits no inverted nipple, no mass, no nipple discharge, no skin change, no tenderness, presence, no bleeding and no swelling. Left breast exhibits no inverted nipple, no mass, no nipple discharge, no skin change, no tenderness, presence, no bleeding and no swelling.        Abdominal: Soft. She exhibits no distension. There is no  abdominal tenderness.     Genitourinary:    Urethra, vagina, uterus, right adnexa and left adnexa normal.   The external female genitalia was normal.   Cervix is normal.           Musculoskeletal: Normal range of motion and moves all extremeties. No tenderness or edema.       Neurological: She is alert and oriented to person, place, and time.    Skin: Skin is warm and dry. No rash noted. She is not diaphoretic.    Psychiatric: She has a normal mood and affect. Her behavior is normal.         Assessment/Plan:       WWE  - Vaccines utd  - Pap utd  - Mammogram utd  - GC/CT, affirm n/a  - Daily vitamin discussed.  - Consistent condom use discussed. Vasectomy planned  - CBE normal. Physical exam normal. VSS.  - Topical estrace rx sent  - PFPT referral placed  - RTC for annual or PRN.      Zuleyka Gardner MD  Obstetrics & Gynecology  [facility] - [facility]

## 2024-11-04 NOTE — PATIENT INSTRUCTIONS
Persons nine to 18 years of age: 1,300 mg of calcium, 600 IU of vitamin D  Persons 19 to 50 years of age: 1,000 mg of calcium, 600 IU of vitamin D  Persons 51 to 70 years of age: 1,200 mg of calcium, 600 IU of vitamin D  Persons 71 years and older: 1,200 mg of calcium, 800 IU of vitamin D        PFPT [phone]

## 2025-07-03 ENCOUNTER — PATIENT MESSAGE (OUTPATIENT)
Dept: GASTROENTEROLOGY | Facility: CLINIC | Age: 45
End: 2025-07-03

## 2025-07-03 ENCOUNTER — TELEPHONE (OUTPATIENT)
Dept: ENDOSCOPY | Facility: HOSPITAL | Age: 45
End: 2025-07-03
Payer: COMMERCIAL

## 2025-07-03 ENCOUNTER — OFFICE VISIT (OUTPATIENT)
Dept: GASTROENTEROLOGY | Facility: CLINIC | Age: 45
End: 2025-07-03
Payer: COMMERCIAL

## 2025-07-03 VITALS — WEIGHT: 155 LBS | HEIGHT: 67 IN | BODY MASS INDEX: 24.33 KG/M2

## 2025-07-03 DIAGNOSIS — K21.9 GASTROESOPHAGEAL REFLUX DISEASE, UNSPECIFIED WHETHER ESOPHAGITIS PRESENT: ICD-10-CM

## 2025-07-03 DIAGNOSIS — D64.9 ANEMIA, UNSPECIFIED TYPE: ICD-10-CM

## 2025-07-03 DIAGNOSIS — R10.13 EPIGASTRIC PAIN: Primary | ICD-10-CM

## 2025-07-03 RX ORDER — FAMOTIDINE 20 MG/1
20 TABLET, FILM COATED ORAL ONCE AS NEEDED
COMMUNITY

## 2025-07-03 NOTE — Clinical Note
GI MA team Please schedule Maggie for 12 hour fasting blood work she says she would like to go to [facility] for it please help her set up her account that her results are released to her epic my [facility] count so I can see the results  Please schedule her across street at the imaging center for abdominal ultrasound orders placed  Referral placed endoscopy schedulers for EGD  Please provide her with a number for transparency in billing at [facility] so she can make sure what her co-pay is and deductibles will be for these studies  Please schedule a return to clinic telemedicine video visit with me in 8 weeks for follow-up  Thank you

## 2025-07-03 NOTE — PATIENT INSTRUCTIONS
Procedure: EGD    Diagnosis: Abdominal pain, GERD, gas and bloating, dyspepsia    Procedure Timing: Within 4 weeks (Urgent)    Location: Any Site    Additional Scheduling Information: No scheduling concerns    Prep Specifications:Standard prep    Is the patient taking a GLP-1 Agonist:no    Have you attached a patient to this message: yes       GI MA team  Please schedule Maggie for 12 hour fasting blood work she says she would like to go to [facility] [employer] for it please help her set up her account that her results are released to her epic my [facility] count so I can see the results    Please schedule her across street at the imaging center for abdominal ultrasound orders placed    Referral placed endoscopy schedulers for EGD    Please provide her with a number for transparency in billing at [facility] so she can make sure what her co-pay is and deductibles will be for these studies    Please schedule a return to clinic telemedicine video visit with me in 8 weeks for follow-up    Thank you

## 2025-07-03 NOTE — PROGRESS NOTES
The patient location is:  At home  The chief complaint leading to consultation is:  Epigastric pain GERD    Visit type: audiovisual    Face to Face time with patient: 25 minutes of total time spent on the encounter, which includes face to face time and non-face to face time preparing to see the patient (eg, review of tests), Obtaining and/or reviewing separately obtained history, Documenting clinical information in the electronic or other health record, Independently interpreting results (not separately reported) and communicating results to the patient/family/caregiver, or Care coordination (not separately reported).     Each patient to whom he or she provides medical services by telemedicine is:  (1) informed of the relationship between the physician and patient and the respective role of any other health care provider with respect to management of the patient; and (2) notified that he or she may decline to receive medical services by telemedicine and may withdraw from such care at any time.    Notes:           [facility] Consultation Note    Reason for Consult:  The primary encounter diagnosis was Epigastric pain. Diagnoses of Gastroesophageal reflux disease, unspecified whether esophagitis present and Anemia, unspecified type were also pertinent to this visit.    PCP:   [address] / [id number]    Referring MD:  Bhanu Chiang Md  [address] [address]    Initial History of Present Illness (HPI):  This is a 44 y.o. female here for evaluation of epigastric pain and GERD symptoms which got pretty bad recently her epigastric pain kind of started while she was sleeping lasted about 4 days did not radiate to her right upper quadrant or to her back but she says in the past she has had some right upper quadrant pain it was associated with nausea no vomiting no fever no chills no shortness of breath no chest pain no diarrhea no constipation no change  in bowel habits no blood in her stool.  She says she has a family history of dad and brother who had to get EGDs and had to be on PPIs she says in the past she took Nexium for about a week and her symptoms went away she has been taking Pepcid her abdominal pain resolved after about 4 days it was a 6/10 on a 10 point scale which was pretty significant no history of any trauma no rash.  It got a pretty stressed out she was stressed out with work and some personal stuff she said as well at the time but would like to have further evaluation but she says she has high deductibles and co-pay is with her insurance she she is not interested in a CAT scan right now she is interested in some 12 hour fasting lab work and abdominal ultrasound or an EGD if her co-pay is are not too much.  Will try to expedite the EGD off PPIs and off H2 blockers to see if she has significant acid reflux she is kind of concerned about being on acid suppressive medicine long-term and would prefer not to be if not needed.    Abdominal pain -as above  Reflux - as above  Dysphagia - no   Bowel habits - normal  GI bleeding - none  NSAID usage - none    Interval HPI 07/03/2025:  The patient's last visit with me was on Visit date not found.      ROS:  Constitutional: No fevers, chills, No weight loss  ENT:  As above heartburn no dysphagia no odynophagia no hoarseness  CV: No chest pain, no palpitation  Pulm: No cough, No shortness of breath, no wheezing  Ophtho: No vision changes  GI: see HPI  Derm: No rash, no itching  Heme: No lymphadenopathy, No easy bruising  MSK: No significant arthritis  : No dysuria, No hematuria  Endo: No hot or cold intolerance  Neuro: No syncope, No seizure, no strokes  Psych: No uncontrolled anxiety, No uncontrolled depression    Medical History:  has a past medical history of History of gestational hypertension and History of IUGR (intrauterine growth retardation) and stillbirth, currently pregnant (3/11/2022).    Surgical  "History:  has a past surgical history that includes Foot surgery (Left, 2000) and [place] tooth extraction.    Family History: family history is not on file..     Social History:  reports that she has never smoked. She has never used smokeless tobacco. She reports that she does not drink alcohol and does not use drugs.    Review of patient's allergies indicates:   Allergen Reactions    Alcohol Other (See Comments)    Sulfa (sulfonamide antibiotics) Nausea And Vomiting       Medication List with Changes/Refills   Current Medications    ESTRADIOL (ESTRACE) 0.01 % (0.1 MG/GRAM) VAGINAL CREAM    Apply pea-sized amount nightly for two weeks then twice a week there after    FAMOTIDINE (PEPCID) 20 MG TABLET    Take 20 mg by mouth once as needed for Heartburn.    LORATADINE (CLARITIN ORAL)    Take by mouth as needed.    MULTIVITAMIN ORAL    Take 1 capsule by mouth.    RETIN-A 0.025 % CREAM    Apply topically.         Objective Findings:    Vital Signs:  Ht 5' 7" (1.702 m)   Wt 70.3 kg (155 lb)   BMI 24.28 kg/m²   Body mass index is 24.28 kg/m².    Physical Exam:  Telemedicine video visit  General Appearance: Well appearing in no acute distress  Eyes:    No scleral icterus  Neurologic:  Alert and oriented x4  Psychiatric:  Normal speech mentation and affect    Labs:  Lab Results   Component Value Date    WBC 9.79 08/26/2022    HGB 9.7 (L) 08/26/2022    HCT 27.9 (L) 08/26/2022     08/26/2022    ALT 11 08/25/2022    AST 19 08/25/2022     (L) 08/25/2022    K 3.9 08/25/2022     08/25/2022    CREATININE 0.7 08/25/2022    BUN 8 08/25/2022    CO2 18 (L) 08/25/2022    TSH 0.088 (L) 06/03/2022       Medical Decision Making:  Prior lab work reviewed was anemic at that time  12 hour fasting lab work talk given  CT scan talk given patient would like to hold off on that  Ultrasound talk given  EGD talk given      Assessment:  1. Epigastric pain    2. Gastroesophageal reflux disease, unspecified whether esophagitis " present    3. Anemia, unspecified type         Recommendations:  1. 12 hour fasting blood work  2. Abdominal ultrasound patient would like to hold off on CT scan  3. Referral to endoscopy schedulers for EGD  4. Return to GI clinic 8 weeks for follow-up okay for telemedicine video visit sooner if any concerns or issues        Order summary:  Orders Placed This Encounter    US Abdomen Complete    Celiac Disease Panel    Vitamin B12    Vitamin D    CBC Auto Differential    Iron and TIBC    Ferritin    Lipase    Comprehensive Metabolic Panel    HCG, Quantitative         Thank you so much for allowing me to participate in the care of Maggie Samuel MD    DISCLAIMER: This note was prepared with [employer] voice recognition transcription software. Garbled syntax, mangled or inadvertent pronouns, and other bizarre constructions may be attributed to that software system. While efforts were made to correct any mistakes made by this voice recognition program, some errors and/or omissions may remain in the note that were missed when the note was originally created.

## 2025-07-03 NOTE — Clinical Note
Procedure: EGD  Diagnosis: Abdominal pain, GERD, gas and bloating, dyspepsia  Procedure Timing: Within 4 weeks (Urgent)  Location: Any Site  Additional Scheduling Information: No scheduling concerns  Prep Specifications:Standard prep  Is the patient taking a GLP-1 Agonist:no  Have you attached a patient to this message: yes

## 2025-07-03 NOTE — TELEPHONE ENCOUNTER
----- Message from Yudith sent at 7/3/2025  1:47 PM CDT -----    ----- Message -----  From: Chidi Samuel MD  Sent: 7/3/2025   9:22 AM CDT  To: [facility]    Procedure: EGD    Diagnosis: Abdominal pain, GERD, gas and bloating, dyspepsia    Procedure Timing: Within 4 weeks (Urgent)    Location: Any Site    Additional Scheduling Information: No scheduling concerns    Prep Specifications:Standard prep    Is the patient taking a GLP-1 Agonist:no    Have you attached a patient to this message: yes

## 2025-07-21 RX ORDER — OMEPRAZOLE 40 MG/1
40 CAPSULE, DELAYED RELEASE ORAL DAILY
Qty: 90 CAPSULE | Refills: 3 | Status: SHIPPED | OUTPATIENT
Start: 2025-07-21 | End: 2026-07-21

## 2025-08-05 ENCOUNTER — PATIENT MESSAGE (OUTPATIENT)
Dept: OBSTETRICS AND GYNECOLOGY | Facility: CLINIC | Age: 45
End: 2025-08-05
Payer: COMMERCIAL

## 2025-08-11 ENCOUNTER — HOSPITAL ENCOUNTER (OUTPATIENT)
Dept: RADIOLOGY | Facility: HOSPITAL | Age: 45
Discharge: HOME | End: 2025-08-11
Attending: STUDENT IN AN ORGANIZED HEALTH CARE EDUCATION/TRAINING PROGRAM
Payer: COMMERCIAL

## 2025-08-11 VITALS — WEIGHT: 155 LBS | HEIGHT: 67 IN | BODY MASS INDEX: 24.33 KG/M2

## 2025-08-11 DIAGNOSIS — Z12.31 ENCOUNTER FOR SCREENING MAMMOGRAM FOR BREAST CANCER: ICD-10-CM

## 2025-08-11 PROCEDURE — 77063 BREAST TOMOSYNTHESIS BI: CPT | Mod: 26,,, | Performed by: RADIOLOGY

## 2025-08-11 PROCEDURE — 77067 SCR MAMMO BI INCL CAD: CPT | Mod: 26,,, | Performed by: RADIOLOGY

## 2025-08-11 PROCEDURE — 77067 SCR MAMMO BI INCL CAD: CPT | Mod: TC
